# Patient Record
Sex: MALE | Race: WHITE
[De-identification: names, ages, dates, MRNs, and addresses within clinical notes are randomized per-mention and may not be internally consistent; named-entity substitution may affect disease eponyms.]

---

## 2019-11-09 ENCOUNTER — HEALTH MAINTENANCE LETTER (OUTPATIENT)
Age: 59
End: 2019-11-09

## 2020-12-06 ENCOUNTER — HEALTH MAINTENANCE LETTER (OUTPATIENT)
Age: 60
End: 2020-12-06

## 2021-09-26 ENCOUNTER — HEALTH MAINTENANCE LETTER (OUTPATIENT)
Age: 61
End: 2021-09-26

## 2022-01-15 ENCOUNTER — HEALTH MAINTENANCE LETTER (OUTPATIENT)
Age: 62
End: 2022-01-15

## 2023-04-23 ENCOUNTER — HEALTH MAINTENANCE LETTER (OUTPATIENT)
Age: 63
End: 2023-04-23

## 2023-09-13 ENCOUNTER — HOSPITAL ENCOUNTER (OUTPATIENT)
Dept: DATA CONVERSION | Facility: HOSPITAL | Age: 63
End: 2023-09-13

## 2023-09-18 ENCOUNTER — HOSPITAL ENCOUNTER (OUTPATIENT)
Dept: DATA CONVERSION | Facility: HOSPITAL | Age: 63
Discharge: HOME | End: 2023-09-18
Payer: COMMERCIAL

## 2023-09-18 DIAGNOSIS — Z00.6 ENCOUNTER FOR EXAMINATION FOR NORMAL COMPARISON AND CONTROL IN CLINICAL RESEARCH PROGRAM: ICD-10-CM

## 2023-09-18 DIAGNOSIS — C61 MALIGNANT NEOPLASM OF PROSTATE (MULTI): ICD-10-CM

## 2023-09-18 LAB
ALBUMIN SERPL-MCNC: 4.1 GM/DL (ref 3.5–5)
ALBUMIN/GLOB SERPL: 1.8 RATIO (ref 1.5–3)
ALP BLD-CCNC: 81 U/L (ref 35–125)
ALT SERPL-CCNC: 16 U/L (ref 5–40)
ANION GAP SERPL CALCULATED.3IONS-SCNC: 11 MMOL/L (ref 0–19)
AST SERPL-CCNC: 20 U/L (ref 5–40)
BASOPHILS # BLD AUTO: 0.02 K/UL (ref 0–0.22)
BASOPHILS NFR BLD AUTO: 0.5 % (ref 0–1)
BILIRUB SERPL-MCNC: 0.3 MG/DL (ref 0.1–1.2)
BUN SERPL-MCNC: 19 MG/DL (ref 8–25)
BUN/CREAT SERPL: 17.3 RATIO (ref 8–21)
CALCIUM SERPL-MCNC: 8.8 MG/DL (ref 8.5–10.4)
CHLORIDE SERPL-SCNC: 106 MMOL/L (ref 97–107)
CO2 SERPL-SCNC: 26 MMOL/L (ref 24–31)
CREAT SERPL-MCNC: 1.1 MG/DL (ref 0.4–1.6)
DEPRECATED RDW RBC AUTO: 43.9 FL (ref 37–54)
DIFFERENTIAL METHOD BLD: ABNORMAL
EOSINOPHIL # BLD AUTO: 0.12 K/UL (ref 0–0.45)
EOSINOPHIL NFR BLD: 3 % (ref 0–3)
ERYTHROCYTE [DISTWIDTH] IN BLOOD BY AUTOMATED COUNT: 13.3 % (ref 11.7–15)
GFR SERPL CREATININE-BSD FRML MDRD: 75 ML/MIN/1.73 M2
GLOBULIN SER-MCNC: 2.3 G/DL (ref 1.9–3.7)
GLUCOSE SERPL-MCNC: 110 MG/DL (ref 65–99)
HCT VFR BLD AUTO: 39.6 % (ref 41–50)
HGB BLD-MCNC: 13.2 GM/DL (ref 13.5–16.5)
IMM GRANULOCYTES # BLD AUTO: 0.01 K/UL (ref 0–0.1)
LYMPHOCYTES # BLD AUTO: 0.78 K/UL (ref 1.2–3.2)
LYMPHOCYTES NFR BLD MANUAL: 19.5 % (ref 20–40)
MCH RBC QN AUTO: 29.9 PG (ref 26–34)
MCHC RBC AUTO-ENTMCNC: 33.3 % (ref 31–37)
MCV RBC AUTO: 89.8 FL (ref 80–100)
MONOCYTES # BLD AUTO: 0.49 K/UL (ref 0–0.8)
MONOCYTES NFR BLD MANUAL: 12.2 % (ref 0–8)
NEUTROPHILS # BLD AUTO: 2.59 K/UL
NEUTROPHILS # BLD AUTO: 2.59 K/UL (ref 1.8–7.7)
NEUTROPHILS.IMMATURE NFR BLD: 0.2 % (ref 0–1)
NEUTS SEG NFR BLD: 64.6 % (ref 50–70)
NRBC BLD-RTO: 0 /100 WBC
PLATELET # BLD AUTO: 184 K/UL (ref 150–450)
PMV BLD AUTO: 11.2 CU (ref 7–12.6)
POTASSIUM SERPL-SCNC: 4.2 MMOL/L (ref 3.4–5.1)
PROT SERPL-MCNC: 6.4 G/DL (ref 5.9–7.9)
RBC # BLD AUTO: 4.41 M/UL (ref 4.5–5.5)
SODIUM SERPL-SCNC: 142 MMOL/L (ref 133–145)
TESTOST SERPL-MCNC: 254 NG/DL (ref 280–800)
WBC # BLD AUTO: 4 K/UL (ref 4.5–11)

## 2023-09-19 LAB — PSA SERPL-MCNC: 0.1 NG/ML (ref 0–4.1)

## 2023-09-25 NOTE — PROGRESS NOTES
Clifton Gan is a 63 y.o. male on day 0 of admission presenting with No Principal Problem: There is no principal problem currently on the Problem List. Please update the Problem List and refresh..    Subjective   ***       Objective     Physical Exam    Last Recorded Vitals  There were no vitals taken for this visit.  Intake/Output last 3 Shifts:  No intake/output data recorded.    Relevant Results  {If you would like to pull in Medications, type .meds     If you would like to pull in Lab results for the last 24 hours, type .[id number]    If you would like to pull in Imaging results, type .imgrslt :99}    {Link to Stroke Scoring tools - Link :99}                       Assessment/Plan   Active Problems:  There are no active Hospital Problems.    ***     {This patient does not have an ACP note on file for this encounter, please fill one out - Advance Care Planning Activity :99}  I spent *** minutes in the professional and overall care of this patient.      Loco Elliott, APRN-CNP

## 2023-10-04 PROBLEM — E66.9 CLASS 1 OBESITY WITH BODY MASS INDEX (BMI) OF 30.0 TO 30.9 IN ADULT: Status: ACTIVE | Noted: 2023-10-04

## 2023-10-04 PROBLEM — K42.9 UMBILICAL HERNIA WITHOUT OBSTRUCTION AND WITHOUT GANGRENE: Status: ACTIVE | Noted: 2023-10-04

## 2023-10-04 PROBLEM — R00.1 BRADYCARDIA: Status: ACTIVE | Noted: 2023-10-04

## 2023-10-04 PROBLEM — I10 ESSENTIAL HYPERTENSION: Status: ACTIVE | Noted: 2023-10-04

## 2023-10-04 PROBLEM — N52.9 IMPOTENCE OF ORGANIC ORIGIN: Status: ACTIVE | Noted: 2023-10-04

## 2023-10-04 PROBLEM — R06.02 SHORTNESS OF BREATH ON EXERTION: Status: ACTIVE | Noted: 2022-11-14

## 2023-10-04 PROBLEM — Z87.898 HISTORY OF PALPITATIONS: Status: ACTIVE | Noted: 2023-10-04

## 2023-10-04 PROBLEM — I10 HIGH BLOOD PRESSURE: Status: ACTIVE | Noted: 2023-10-04

## 2023-10-04 PROBLEM — E78.2 MIXED HYPERLIPIDEMIA: Status: ACTIVE | Noted: 2023-10-04

## 2023-10-04 PROBLEM — C61 PROSTATE CANCER (MULTI): Status: ACTIVE | Noted: 2023-10-04

## 2023-10-04 PROBLEM — E66.811 CLASS 1 OBESITY WITH BODY MASS INDEX (BMI) OF 30.0 TO 30.9 IN ADULT: Status: ACTIVE | Noted: 2023-10-04

## 2023-10-04 RX ORDER — DOCUSATE SODIUM 100 MG/1
200 CAPSULE, LIQUID FILLED ORAL DAILY
COMMUNITY
End: 2023-10-05

## 2023-10-04 RX ORDER — KETOROLAC TROMETHAMINE 5 MG/ML
1 SOLUTION OPHTHALMIC 3 TIMES DAILY
COMMUNITY
End: 2023-10-05

## 2023-10-04 RX ORDER — HYDROCHLOROTHIAZIDE 25 MG/1
TABLET ORAL
COMMUNITY
Start: 2022-08-08 | End: 2023-10-05

## 2023-10-04 RX ORDER — ASPIRIN 81 MG/1
TABLET ORAL
COMMUNITY

## 2023-10-04 RX ORDER — METOPROLOL TARTRATE 25 MG/1
TABLET, FILM COATED ORAL
COMMUNITY
Start: 2023-02-01 | End: 2023-10-05 | Stop reason: ALTCHOICE

## 2023-10-04 RX ORDER — TRIAZOLAM 0.12 MG/1
TABLET ORAL
COMMUNITY
Start: 2023-03-23 | End: 2023-10-05 | Stop reason: ALTCHOICE

## 2023-10-04 RX ORDER — CARVEDILOL 6.25 MG/1
12.5 TABLET ORAL 2 TIMES DAILY
COMMUNITY
Start: 2022-10-17 | End: 2024-01-24 | Stop reason: ALTCHOICE

## 2023-10-04 RX ORDER — AMOXICILLIN 500 MG/1
CAPSULE ORAL
COMMUNITY
Start: 2023-03-23 | End: 2023-10-05

## 2023-10-04 RX ORDER — IBUPROFEN 600 MG/1
TABLET ORAL
COMMUNITY
Start: 2023-03-23 | End: 2023-10-05

## 2023-10-04 RX ORDER — OLMESARTAN MEDOXOMIL 20 MG/1
1 TABLET ORAL DAILY
COMMUNITY
Start: 2022-10-10 | End: 2023-10-05

## 2023-10-04 RX ORDER — MOMETASONE FUROATE 50 UG/1
1 SPRAY, METERED NASAL 2 TIMES DAILY
COMMUNITY
End: 2023-10-05

## 2023-10-04 RX ORDER — TAMSULOSIN HYDROCHLORIDE 0.4 MG/1
0.8 CAPSULE ORAL DAILY
COMMUNITY
Start: 2022-10-06 | End: 2023-10-05

## 2023-10-04 RX ORDER — SILDENAFIL 100 MG/1
100 TABLET, FILM COATED ORAL DAILY PRN
COMMUNITY
Start: 2016-03-14 | End: 2023-10-05

## 2023-10-04 RX ORDER — PREDNISONE 5 MG/1
1 TABLET ORAL DAILY
COMMUNITY
Start: 2022-09-21 | End: 2023-10-05

## 2023-10-04 RX ORDER — CHLORHEXIDINE GLUCONATE ORAL RINSE 1.2 MG/ML
SOLUTION DENTAL
COMMUNITY
Start: 2023-03-27 | End: 2023-10-05

## 2023-10-04 RX ORDER — ABIRATERONE 500 MG/1
1000 TABLET ORAL
COMMUNITY
Start: 2022-10-10 | End: 2023-10-05

## 2023-10-04 RX ORDER — AMLODIPINE BESYLATE 10 MG/1
10 TABLET ORAL DAILY
COMMUNITY
End: 2023-11-07

## 2023-10-05 ENCOUNTER — OFFICE VISIT (OUTPATIENT)
Dept: CARDIOLOGY | Facility: CLINIC | Age: 63
End: 2023-10-05
Payer: COMMERCIAL

## 2023-10-05 VITALS
DIASTOLIC BLOOD PRESSURE: 87 MMHG | RESPIRATION RATE: 18 BRPM | OXYGEN SATURATION: 95 % | SYSTOLIC BLOOD PRESSURE: 141 MMHG | HEART RATE: 60 BPM | TEMPERATURE: 98.1 F

## 2023-10-05 DIAGNOSIS — E78.2 MIXED HYPERLIPIDEMIA: ICD-10-CM

## 2023-10-05 DIAGNOSIS — C61 PROSTATE CANCER (MULTI): ICD-10-CM

## 2023-10-05 DIAGNOSIS — I10 HYPERTENSION, UNSPECIFIED TYPE: Primary | ICD-10-CM

## 2023-10-05 PROCEDURE — 1036F TOBACCO NON-USER: CPT | Performed by: NURSE PRACTITIONER

## 2023-10-05 PROCEDURE — 99213 OFFICE O/P EST LOW 20 MIN: CPT | Performed by: NURSE PRACTITIONER

## 2023-10-05 PROCEDURE — 3077F SYST BP >= 140 MM HG: CPT | Performed by: NURSE PRACTITIONER

## 2023-10-05 PROCEDURE — 3079F DIAST BP 80-89 MM HG: CPT | Performed by: NURSE PRACTITIONER

## 2023-10-05 ASSESSMENT — PAIN SCALES - GENERAL: PAINLEVEL: 0-NO PAIN

## 2023-10-05 NOTE — PROGRESS NOTES
Chief complaint - BP check  HPI:  BP up this morning.  His energy is better.   Exercises >4mets about 4 times per week.    Denies SOB, chest pain, syncope, +edema but less. No orthopnea, PND.    Full 14 point ROS complete and negative.    /89   PE:  alert, NAD  Lungs CTA  Heart RRR S1S2 no m/r/g no jvd  1+ ankle edema bilat  Abd round, nontender  Extrem well perfused, PIERSON =    A/P  62 yo CM with h/o prostate cancer, now off ADT.  Feeling well.  Last echo normal in 11/2022.  HTN is moderately controlled.      - continue same meds for now  - telephone follow up to ensure BP is not increasing  - would add ACEI + hydrochlorothiazide and stop amlodipine if BP staying up.

## 2023-10-06 PROBLEM — I10 ESSENTIAL HYPERTENSION: Chronic | Status: ACTIVE | Noted: 2023-10-04

## 2023-11-07 DIAGNOSIS — I10 PRIMARY HYPERTENSION: Primary | ICD-10-CM

## 2023-11-07 RX ORDER — AMLODIPINE BESYLATE 10 MG/1
10 TABLET ORAL DAILY
Qty: 90 TABLET | Refills: 1 | Status: SHIPPED | OUTPATIENT
Start: 2023-11-07 | End: 2024-02-28 | Stop reason: SDUPTHER

## 2023-11-28 ENCOUNTER — TELEPHONE (OUTPATIENT)
Dept: RADIATION ONCOLOGY | Facility: HOSPITAL | Age: 63
End: 2023-11-28
Payer: COMMERCIAL

## 2023-11-29 NOTE — PROGRESS NOTES
Cancer synopsis:  Rad/onc: Dr. Gibson/ Lexi CNP  Med/onc: Dr. Arvizu/ Krissy CNP    2010 PSA 10, Prostate Bx negative per patient   2020 Prostate Bx negative per patient      2/2/22: PSA 45. MRI shows 2 lesions at R central zone of prostate     7/12/22: TRUS Prostate bx reveals Prostate Adenocarcinoma. GS 4+4=8, GG4 1/2 cores, 10% of tissue, No LVI     7/26/22: NM Tc 99 bone scan reveals no definitive evidence of  metastatic disease. CT C/A/P without definitive evidence of metastatic disease  8/22/2022: PSMA PET  with hypermetabolic focus on the R prostate gland  (SUVmax 17.4). No ROYAL  or metastatic disease.  9/21/2022: Cycle 1 [facility] 1821  10/11/2022: Abiraterone held due to grade 3 HTN  10/14/2022: Resumed Abiraterone  11/30/2022: Start XRT to prostate (complete 12/9/22)  3/8/2023: Off treatment, completed all 6 cycles on [id number]    History of presenting illness:    Patient ID: [id number]     Clifton Gan is a 63 y.o. male who presents for his high risk prostate cancer GS 4+4=8, IPSA 10 now s/p SBRT to prostate and 6m of intensified systemic therapy per [facility] 1821    RT Site: prostate  RT Date: 11/30/2022  Hormone therapy: Yes, ADT, naraparib and AA/p 6m done 03/08/2023  Hot Flushes: Denies  Fatigue: Denies. Report mood and energy have overall improved now with testerone return.  Bone pain: Denies  ASHLEY: 1  ED: Admits to decline in erectile function due to lack of interest but not overall concerned per patient. Does have PDE5i.  ED medications: yes, Viagra has not used yet.  IPSS:   Urinary symptoms: Admits to LUTS including weak stream, frequency and urinary dribble when not taking flowmax BID. Denies dysuria, hematuria or urine leakage.  Urinary Medications: No  Rectal bleeding: Denies  Other systems: Denies SOB, Cp or fever.      Review of systems:  Review of Systems   Constitutional:  Negative for fatigue, fever and unexpected weight change.   Respiratory:  Negative for cough, chest tightness and shortness  "of breath.    Cardiovascular:  Negative for chest pain, palpitations and leg swelling.   Gastrointestinal:  Negative for abdominal pain, anal bleeding, blood in stool, constipation, diarrhea and rectal pain.   Endocrine: Negative for cold intolerance, heat intolerance and polyuria.   Genitourinary:  Negative for decreased urine volume, difficulty urinating, dysuria, frequency, hematuria and urgency.   Musculoskeletal:  Negative for arthralgias, back pain, gait problem and joint swelling.   Skin: Negative.    Allergic/Immunologic: Negative.    Neurological:  Negative for dizziness, syncope and weakness.   Psychiatric/Behavioral: Negative.         Past Medical history  Past Medical History:   Diagnosis Date    Other acute postprocedural pain 05/12/2021    Acute post-operative pain    Personal history of other diseases of the circulatory system 04/02/2021    History of hypertension    Personal history of other specified conditions 10/10/2022    History of palpitations        Surgical/family history  Family History   Problem Relation Name Age of Onset    Prostate cancer Brother        Past Surgical History:   Procedure Laterality Date    OTHER SURGICAL HISTORY  10/10/2022    Umbilical hernia repair        Social History  Tobacco Use: High Risk (11/30/2023)    Patient History     Smoking Tobacco Use: Some Days     Smokeless Tobacco Use: Never     Passive Exposure: Not on file         Current med list:  Current Outpatient Medications   Medication Instructions    amLODIPine (NORVASC) 10 mg, oral, Daily    aspirin 81 mg EC tablet     carvedilol (COREG) 12.5 mg, oral, 2 times daily    mv-min/folic/K1/lycopen/lutein (MEN 50 PLUS MULTIVITAMIN ORAL) oral        Last recorded vital:  /88 (BP Location: Right arm, Patient Position: Sitting, BP Cuff Size: Large adult)   Pulse 60   Temp 35.7 °C (96.3 °F) (Temporal)   Resp 18   Ht 1.791 m (5' 10.5\")   Wt 99.5 kg (219 lb 5.7 oz)   SpO2 97%   BMI 31.03 kg/m²     Physical " exam  Physical Exam  Constitutional:       Appearance: Normal appearance.   Cardiovascular:      Rate and Rhythm: Normal rate.   Pulmonary:      Effort: Pulmonary effort is normal.      Breath sounds: Normal breath sounds.   Musculoskeletal:         General: Normal range of motion.      Cervical back: Normal range of motion.   Neurological:      Mental Status: He is alert and oriented to person, place, and time.   Psychiatric:         Mood and Affect: Mood normal.         Behavior: Behavior normal.         Thought Content: Thought content normal.         Judgment: Judgment normal.     ECOG Performance Status: 0 Fully Active   Karnofsky Score (Age >/ 16 yrs): 100- Normal, No  complaints, no evidence of disease       Pertinent labs:  PSA   Date/Time Value Ref Range Status   09/18/2023 01:14 PM 0.1 0.0 - 4.1 NG/ML Final     Comment:     PATIENTS WITH BENIGN PROSTATIC HYPERTROPHY OFTEN DEMONSTRATE VALUES   BETWEEN 4.0-10.0 NG/ML. RESULTS BETWEEN 4.0 AND 10.0 NG/ML MAY   REPRESENT BENIGN PROSTATIC HYPERTROPHY OR POSSIBLE CANCER OF THE   PROSTATE. RESULTS ABOVE 10.0 NG/ML ARE HIGHLY SUGGESTIVE OF PROSTATIC   CANCER.  Performed at [address] [address]           Dx:  Problem List Items Addressed This Visit       Prostate cancer (CMS/[facility]) - Primary    Relevant Orders    PSA, Total and Free    Testosterone    Comprehensive Metabolic Panel    CBC and Auto Differential   Reviewed need for labs today per [facility] 1821 will call with results. Review of latent SE including rectal bleeding, hematuria, urinary strictures, ED where reviewed as well as how to contact office if s/s present. Denies latent SE. NCCN guidelines where reviewed and routine FUV of every 3m for first year and every 6m for four years for a total of five years was discussed. Patient verbalized understanding.     Recommend vitamin D supplementation, start (or increase by) 400-1000 units daily. Reviewed importance of intake while on  Testerone lowering therapy as well as after until Testerone re-establishes, at which point may stop if DEXA indicative of normal bone density. Also reviewed that individuals at a higher risk such as those over the age of 75 or those with a hx of bone fx, osteoporosis or osteopenia to continue supplementation indefinitely with routine DEXA imaging as per national guidelines to assess bone health continually.     Discussed elevated SBP. Reports not taking BP medications. Reviewed need to take medication as prescribed d/t risk of small vessel damage. Patient amendable.    PLAN:  FUV 3m  Labs Labs today and in 3m (CBC/CMP/ Psa and testerone)  Imaging none  FUV other providers: PCP for routine evals        Please contact office with any concerns:  Loco Chavez CNP  [phone]

## 2023-11-30 ENCOUNTER — HOSPITAL ENCOUNTER (OUTPATIENT)
Dept: RADIATION ONCOLOGY | Facility: HOSPITAL | Age: 63
Setting detail: RADIATION/ONCOLOGY SERIES
Discharge: HOME | End: 2023-11-30
Payer: COMMERCIAL

## 2023-11-30 ENCOUNTER — LAB (OUTPATIENT)
Dept: LAB | Facility: HOSPITAL | Age: 63
End: 2023-11-30
Payer: COMMERCIAL

## 2023-11-30 VITALS
BODY MASS INDEX: 30.71 KG/M2 | OXYGEN SATURATION: 97 % | HEIGHT: 71 IN | SYSTOLIC BLOOD PRESSURE: 156 MMHG | TEMPERATURE: 96.3 F | HEART RATE: 60 BPM | DIASTOLIC BLOOD PRESSURE: 88 MMHG | RESPIRATION RATE: 18 BRPM | WEIGHT: 219.36 LBS

## 2023-11-30 DIAGNOSIS — C61 PROSTATE CANCER (MULTI): Primary | ICD-10-CM

## 2023-11-30 DIAGNOSIS — R39.9 LOWER URINARY TRACT SYMPTOMS (LUTS): ICD-10-CM

## 2023-11-30 DIAGNOSIS — C61 PROSTATE CANCER (MULTI): ICD-10-CM

## 2023-11-30 LAB
ALBUMIN SERPL BCP-MCNC: 4.4 G/DL (ref 3.4–5)
ALP SERPL-CCNC: 74 U/L (ref 33–136)
ALT SERPL W P-5'-P-CCNC: 17 U/L (ref 10–52)
ANION GAP SERPL CALC-SCNC: 12 MMOL/L (ref 10–20)
AST SERPL W P-5'-P-CCNC: 16 U/L (ref 9–39)
BASOPHILS # BLD AUTO: 0.01 X10*3/UL (ref 0–0.1)
BASOPHILS NFR BLD AUTO: 0.2 %
BILIRUB SERPL-MCNC: 0.4 MG/DL (ref 0–1.2)
BUN SERPL-MCNC: 18 MG/DL (ref 6–23)
CALCIUM SERPL-MCNC: 9.4 MG/DL (ref 8.6–10.3)
CHLORIDE SERPL-SCNC: 102 MMOL/L (ref 98–107)
CO2 SERPL-SCNC: 27 MMOL/L (ref 21–32)
CREAT SERPL-MCNC: 0.95 MG/DL (ref 0.5–1.3)
EOSINOPHIL # BLD AUTO: 0.07 X10*3/UL (ref 0–0.7)
EOSINOPHIL NFR BLD AUTO: 1.2 %
ERYTHROCYTE [DISTWIDTH] IN BLOOD BY AUTOMATED COUNT: 13.2 % (ref 11.5–14.5)
GFR SERPL CREATININE-BSD FRML MDRD: 89 ML/MIN/1.73M*2
GLUCOSE SERPL-MCNC: 93 MG/DL (ref 74–99)
HCT VFR BLD AUTO: 41.9 % (ref 41–52)
HGB BLD-MCNC: 14.3 G/DL (ref 13.5–17.5)
IMM GRANULOCYTES # BLD AUTO: 0.02 X10*3/UL (ref 0–0.7)
IMM GRANULOCYTES NFR BLD AUTO: 0.4 % (ref 0–0.9)
LYMPHOCYTES # BLD AUTO: 0.83 X10*3/UL (ref 1.2–4.8)
LYMPHOCYTES NFR BLD AUTO: 14.6 %
MCH RBC QN AUTO: 30.6 PG (ref 26–34)
MCHC RBC AUTO-ENTMCNC: 34.1 G/DL (ref 32–36)
MCV RBC AUTO: 90 FL (ref 80–100)
MONOCYTES # BLD AUTO: 0.62 X10*3/UL (ref 0.1–1)
MONOCYTES NFR BLD AUTO: 10.9 %
NEUTROPHILS # BLD AUTO: 4.14 X10*3/UL (ref 1.2–7.7)
NEUTROPHILS NFR BLD AUTO: 72.7 %
NRBC BLD-RTO: 0 /100 WBCS (ref 0–0)
PLATELET # BLD AUTO: 216 X10*3/UL (ref 150–450)
POTASSIUM SERPL-SCNC: 4.4 MMOL/L (ref 3.5–5.3)
PROT SERPL-MCNC: 7 G/DL (ref 6.4–8.2)
RBC # BLD AUTO: 4.68 X10*6/UL (ref 4.5–5.9)
SODIUM SERPL-SCNC: 137 MMOL/L (ref 136–145)
TESTOST SERPL-MCNC: 416 NG/DL (ref 240–1000)
WBC # BLD AUTO: 5.7 X10*3/UL (ref 4.4–11.3)

## 2023-11-30 PROCEDURE — 80053 COMPREHEN METABOLIC PANEL: CPT

## 2023-11-30 PROCEDURE — 36415 COLL VENOUS BLD VENIPUNCTURE: CPT

## 2023-11-30 PROCEDURE — 84403 ASSAY OF TOTAL TESTOSTERONE: CPT

## 2023-11-30 PROCEDURE — 99214 OFFICE O/P EST MOD 30 MIN: CPT

## 2023-11-30 PROCEDURE — 84153 ASSAY OF PSA TOTAL: CPT

## 2023-11-30 PROCEDURE — 85025 COMPLETE CBC W/AUTO DIFF WBC: CPT

## 2023-11-30 PROCEDURE — 84154 ASSAY OF PSA FREE: CPT

## 2023-11-30 RX ORDER — TAMSULOSIN HYDROCHLORIDE 0.4 MG/1
0.4 CAPSULE ORAL 2 TIMES DAILY
Qty: 60 CAPSULE | Refills: 6 | Status: SHIPPED | OUTPATIENT
Start: 2023-11-30 | End: 2024-06-27

## 2023-11-30 ASSESSMENT — COLUMBIA-SUICIDE SEVERITY RATING SCALE - C-SSRS
2. HAVE YOU ACTUALLY HAD ANY THOUGHTS OF KILLING YOURSELF?: NO
6. HAVE YOU EVER DONE ANYTHING, STARTED TO DO ANYTHING, OR PREPARED TO DO ANYTHING TO END YOUR LIFE?: NO
1. IN THE PAST MONTH, HAVE YOU WISHED YOU WERE DEAD OR WISHED YOU COULD GO TO SLEEP AND NOT WAKE UP?: NO

## 2023-11-30 ASSESSMENT — ENCOUNTER SYMPTOMS
FATIGUE: 0
FEVER: 0
BLOOD IN STOOL: 0
PSYCHIATRIC NEGATIVE: 1
RECTAL PAIN: 0
FREQUENCY: 0
BACK PAIN: 0
CHEST TIGHTNESS: 0
ANAL BLEEDING: 0
UNEXPECTED WEIGHT CHANGE: 0
JOINT SWELLING: 0
CONSTIPATION: 0
OCCASIONAL FEELINGS OF UNSTEADINESS: 0
ABDOMINAL PAIN: 0
DYSURIA: 0
DEPRESSION: 0
DIZZINESS: 0
HEMATURIA: 0
COUGH: 0
DIARRHEA: 0
WEAKNESS: 0
ALLERGIC/IMMUNOLOGIC NEGATIVE: 1
SHORTNESS OF BREATH: 0
ARTHRALGIAS: 0
PALPITATIONS: 0
DIFFICULTY URINATING: 0
LOSS OF SENSATION IN FEET: 0

## 2023-11-30 ASSESSMENT — PATIENT HEALTH QUESTIONNAIRE - PHQ9
2. FEELING DOWN, DEPRESSED OR HOPELESS: NOT AT ALL
1. LITTLE INTEREST OR PLEASURE IN DOING THINGS: NOT AT ALL
SUM OF ALL RESPONSES TO PHQ9 QUESTIONS 1 AND 2: 0

## 2023-11-30 ASSESSMENT — PAIN SCALES - GENERAL: PAINLEVEL: 0-NO PAIN

## 2023-12-02 LAB
PSA FREE MFR SERPL: <100 %
PSA FREE SERPL-MCNC: <0.1 NG/ML
PSA SERPL IA-MCNC: 0.1 NG/ML (ref 0–4)

## 2024-01-24 ENCOUNTER — DOCUMENTATION (OUTPATIENT)
Dept: CARDIOLOGY | Facility: CLINIC | Age: 64
End: 2024-01-24

## 2024-01-24 DIAGNOSIS — I10 ESSENTIAL HYPERTENSION: Primary | Chronic | ICD-10-CM

## 2024-01-24 RX ORDER — LISINOPRIL 10 MG/1
10 TABLET ORAL DAILY
Qty: 30 TABLET | Refills: 3 | Status: SHIPPED | OUTPATIENT
Start: 2024-01-24 | End: 2024-05-14

## 2024-01-24 NOTE — PROGRESS NOTES
Patient called to say his exercise tolerance is not good and he feels like he cannot get his heart rate up.  He also feels very sleepy after working out.    - discontinue carvedilol  - start lisinopril 10mg daily  - 2 week BMP  - follow up in 2-4 weeks in office

## 2024-02-06 NOTE — ADDENDUM NOTE
Encounter addended by: MACRINA Montelongo-CNP on: 2/6/2024 8:57 AM   Actions taken: Clinical Note Signed

## 2024-02-13 ENCOUNTER — TELEMEDICINE (OUTPATIENT)
Dept: PRIMARY CARE | Facility: CLINIC | Age: 64
End: 2024-02-13
Payer: COMMERCIAL

## 2024-02-13 VITALS
WEIGHT: 210 LBS | DIASTOLIC BLOOD PRESSURE: 83 MMHG | TEMPERATURE: 98.7 F | HEART RATE: 69 BPM | BODY MASS INDEX: 29.71 KG/M2 | SYSTOLIC BLOOD PRESSURE: 125 MMHG

## 2024-02-13 DIAGNOSIS — J01.00 ACUTE NON-RECURRENT MAXILLARY SINUSITIS: Primary | ICD-10-CM

## 2024-02-13 PROCEDURE — 99213 OFFICE O/P EST LOW 20 MIN: CPT | Performed by: FAMILY MEDICINE

## 2024-02-13 PROCEDURE — 3079F DIAST BP 80-89 MM HG: CPT | Performed by: FAMILY MEDICINE

## 2024-02-13 PROCEDURE — 3074F SYST BP LT 130 MM HG: CPT | Performed by: FAMILY MEDICINE

## 2024-02-13 RX ORDER — DOXYCYCLINE 100 MG/1
100 CAPSULE ORAL 2 TIMES DAILY
Qty: 14 CAPSULE | Refills: 0 | Status: SHIPPED | OUTPATIENT
Start: 2024-02-13 | End: 2024-02-20

## 2024-02-13 NOTE — PROGRESS NOTES
Subjective   Patient ID: Clifton Gan is a 63 y.o. male who presents for No chief complaint on file..    HPI   Presents today with 9 or 10 days of sinus congestion.  He is get some fullness in the nose and maxillary sinus area.  Has a lot of green discharge.  He has developed a cough over the last 2 days.  He states he had been out traveling [place] and moving through airports in both stations and might of gotten a little rundown.  No fever today.  No signs are the ones reported by him.    He did have his carvedilol stopped and he changed over to lisinopril and since then he is felt pretty good.  He was unable to get his heart rate up at all on the carvedilol and now can exercise and feel good while he is doing so.  Review of Systems    Objective   /83   Pulse 69   Temp 37.1 °C (98.7 °F)   Wt 95.3 kg (210 lb)   BMI 29.71 kg/m²     Physical Exam  He is alert, no apparent distress, his affect is pleasant.  He has a nasal sounding voice.  Respirations are easy.  Conjunctiva appear normal.    Assessment/Plan   Diagnoses and all orders for this visit:  Acute non-recurrent maxillary sinusitis  -     doxycycline (Vibramycin) 100 mg capsule; Take 1 capsule (100 mg) by mouth 2 times a day for 7 days. Take with at least 8 ounces (large glass) of water, do not lie down for 30 minutes after  We use the doxycycline and see how he does over the next few days.  He is not improving much by Friday, he will call.

## 2024-02-28 ENCOUNTER — TELEPHONE (OUTPATIENT)
Dept: RADIATION ONCOLOGY | Facility: HOSPITAL | Age: 64
End: 2024-02-28
Payer: COMMERCIAL

## 2024-02-28 DIAGNOSIS — I10 PRIMARY HYPERTENSION: ICD-10-CM

## 2024-02-28 RX ORDER — AMLODIPINE BESYLATE 10 MG/1
10 TABLET ORAL DAILY
Qty: 90 TABLET | Refills: 3 | Status: SHIPPED | OUTPATIENT
Start: 2024-02-28 | End: 2025-02-27

## 2024-03-01 ENCOUNTER — LAB (OUTPATIENT)
Dept: LAB | Facility: LAB | Age: 64
End: 2024-03-01
Payer: COMMERCIAL

## 2024-03-01 DIAGNOSIS — C61 PROSTATE CANCER (MULTI): ICD-10-CM

## 2024-03-01 DIAGNOSIS — I10 ESSENTIAL HYPERTENSION: Chronic | ICD-10-CM

## 2024-03-01 LAB
ALBUMIN SERPL BCP-MCNC: 4.2 G/DL (ref 3.4–5)
ALP SERPL-CCNC: 77 U/L (ref 33–136)
ALT SERPL W P-5'-P-CCNC: 17 U/L (ref 10–52)
ANION GAP SERPL CALC-SCNC: 12 MMOL/L (ref 10–20)
AST SERPL W P-5'-P-CCNC: 17 U/L (ref 9–39)
BASOPHILS # BLD AUTO: 0.03 X10*3/UL (ref 0–0.1)
BASOPHILS NFR BLD AUTO: 0.6 %
BILIRUB SERPL-MCNC: 0.5 MG/DL (ref 0–1.2)
BUN SERPL-MCNC: 14 MG/DL (ref 6–23)
CALCIUM SERPL-MCNC: 8.8 MG/DL (ref 8.6–10.3)
CHLORIDE SERPL-SCNC: 102 MMOL/L (ref 98–107)
CO2 SERPL-SCNC: 26 MMOL/L (ref 21–32)
CREAT SERPL-MCNC: 1.01 MG/DL (ref 0.5–1.3)
EGFRCR SERPLBLD CKD-EPI 2021: 83 ML/MIN/1.73M*2
EOSINOPHIL # BLD AUTO: 0.22 X10*3/UL (ref 0–0.7)
EOSINOPHIL NFR BLD AUTO: 4.3 %
ERYTHROCYTE [DISTWIDTH] IN BLOOD BY AUTOMATED COUNT: 12.4 % (ref 11.5–14.5)
GLUCOSE SERPL-MCNC: 88 MG/DL (ref 74–99)
HCT VFR BLD AUTO: 42.6 % (ref 41–52)
HGB BLD-MCNC: 14.1 G/DL (ref 13.5–17.5)
IMM GRANULOCYTES # BLD AUTO: 0.01 X10*3/UL (ref 0–0.7)
IMM GRANULOCYTES NFR BLD AUTO: 0.2 % (ref 0–0.9)
LYMPHOCYTES # BLD AUTO: 0.96 X10*3/UL (ref 1.2–4.8)
LYMPHOCYTES NFR BLD AUTO: 18.7 %
MCH RBC QN AUTO: 31 PG (ref 26–34)
MCHC RBC AUTO-ENTMCNC: 33.1 G/DL (ref 32–36)
MCV RBC AUTO: 94 FL (ref 80–100)
MONOCYTES # BLD AUTO: 0.55 X10*3/UL (ref 0.1–1)
MONOCYTES NFR BLD AUTO: 10.7 %
NEUTROPHILS # BLD AUTO: 3.36 X10*3/UL (ref 1.2–7.7)
NEUTROPHILS NFR BLD AUTO: 65.5 %
NRBC BLD-RTO: 0.4 /100 WBCS (ref 0–0)
PLATELET # BLD AUTO: 192 X10*3/UL (ref 150–450)
POTASSIUM SERPL-SCNC: 4 MMOL/L (ref 3.5–5.3)
PROT SERPL-MCNC: 6.4 G/DL (ref 6.4–8.2)
RBC # BLD AUTO: 4.55 X10*6/UL (ref 4.5–5.9)
SODIUM SERPL-SCNC: 136 MMOL/L (ref 136–145)
WBC # BLD AUTO: 5.1 X10*3/UL (ref 4.4–11.3)

## 2024-03-01 PROCEDURE — 85025 COMPLETE CBC W/AUTO DIFF WBC: CPT

## 2024-03-01 PROCEDURE — 84153 ASSAY OF PSA TOTAL: CPT

## 2024-03-01 PROCEDURE — 84403 ASSAY OF TOTAL TESTOSTERONE: CPT

## 2024-03-01 PROCEDURE — 80053 COMPREHEN METABOLIC PANEL: CPT

## 2024-03-01 PROCEDURE — 36415 COLL VENOUS BLD VENIPUNCTURE: CPT

## 2024-03-01 PROCEDURE — 84154 ASSAY OF PSA FREE: CPT

## 2024-03-02 LAB — TESTOST SERPL-MCNC: 390 NG/DL (ref 240–1000)

## 2024-03-04 LAB
PSA FREE MFR SERPL: <100 %
PSA FREE SERPL-MCNC: <0.1 NG/ML
PSA SERPL IA-MCNC: 0.1 NG/ML (ref 0–4)

## 2024-03-05 ENCOUNTER — TELEPHONE (OUTPATIENT)
Dept: RADIATION ONCOLOGY | Facility: HOSPITAL | Age: 64
End: 2024-03-05
Payer: COMMERCIAL

## 2024-03-06 ENCOUNTER — APPOINTMENT (OUTPATIENT)
Dept: RADIATION ONCOLOGY | Facility: HOSPITAL | Age: 64
End: 2024-03-06
Payer: COMMERCIAL

## 2024-03-07 ENCOUNTER — TELEPHONE (OUTPATIENT)
Dept: RADIATION ONCOLOGY | Facility: HOSPITAL | Age: 64
End: 2024-03-07
Payer: COMMERCIAL

## 2024-03-07 NOTE — TELEPHONE ENCOUNTER
Called pt. to remind of appointment on 3/11/2024 at 3:30 pm with MONA Elliott. Pt's phone went to voicemail left number if needs to reschedule.

## 2024-03-11 ENCOUNTER — HOSPITAL ENCOUNTER (OUTPATIENT)
Dept: RADIATION ONCOLOGY | Facility: HOSPITAL | Age: 64
Setting detail: RADIATION/ONCOLOGY SERIES
Discharge: HOME | End: 2024-03-11
Payer: COMMERCIAL

## 2024-03-11 ENCOUNTER — DOCUMENTATION (OUTPATIENT)
Dept: RADIATION ONCOLOGY | Facility: HOSPITAL | Age: 64
End: 2024-03-11

## 2024-03-11 VITALS
RESPIRATION RATE: 18 BRPM | TEMPERATURE: 96.8 F | BODY MASS INDEX: 30.58 KG/M2 | HEIGHT: 70 IN | SYSTOLIC BLOOD PRESSURE: 139 MMHG | OXYGEN SATURATION: 96 % | HEART RATE: 79 BPM | DIASTOLIC BLOOD PRESSURE: 83 MMHG | WEIGHT: 213.6 LBS

## 2024-03-11 DIAGNOSIS — C61 PROSTATE CANCER (MULTI): Primary | ICD-10-CM

## 2024-03-11 PROCEDURE — 99214 OFFICE O/P EST MOD 30 MIN: CPT

## 2024-03-11 ASSESSMENT — ENCOUNTER SYMPTOMS
CHEST TIGHTNESS: 0
JOINT SWELLING: 0
FEVER: 0
FATIGUE: 0
WEAKNESS: 0
COUGH: 0
UNEXPECTED WEIGHT CHANGE: 0
DIFFICULTY URINATING: 0
DYSURIA: 0
HEMATURIA: 0
RECTAL PAIN: 0
OCCASIONAL FEELINGS OF UNSTEADINESS: 0
DIZZINESS: 0
DEPRESSION: 0
ANAL BLEEDING: 0
DIARRHEA: 0
BACK PAIN: 0
SHORTNESS OF BREATH: 0
BLOOD IN STOOL: 0
ALLERGIC/IMMUNOLOGIC NEGATIVE: 1
LOSS OF SENSATION IN FEET: 0
PALPITATIONS: 0
CONSTIPATION: 0
FREQUENCY: 0
ARTHRALGIAS: 0
ABDOMINAL PAIN: 0
PSYCHIATRIC NEGATIVE: 1

## 2024-03-11 ASSESSMENT — PAIN SCALES - GENERAL: PAINLEVEL: 0-NO PAIN

## 2024-03-11 ASSESSMENT — COLUMBIA-SUICIDE SEVERITY RATING SCALE - C-SSRS
2. HAVE YOU ACTUALLY HAD ANY THOUGHTS OF KILLING YOURSELF?: NO
1. IN THE PAST MONTH, HAVE YOU WISHED YOU WERE DEAD OR WISHED YOU COULD GO TO SLEEP AND NOT WAKE UP?: NO
6. HAVE YOU EVER DONE ANYTHING, STARTED TO DO ANYTHING, OR PREPARED TO DO ANYTHING TO END YOUR LIFE?: NO

## 2024-03-11 NOTE — PROGRESS NOTES
Cancer synopsis:  Rad/onc: Dr. Gibson/ Lexi CNP  Med/onc: Dr. Arvizu/ Krissy CNP    2010 PSA 10, Prostate Bx negative per patient   2020 Prostate Bx negative per patient      2/2/22: PSA 45. MRI shows 2 lesions at R central zone of prostate     7/12/22: TRUS Prostate bx reveals Prostate Adenocarcinoma. GS 4+4=8, GG4 1/2 cores, 10% of tissue, No LVI     7/26/22: NM Tc 99 bone scan reveals no definitive evidence of  metastatic disease. CT C/A/P without definitive evidence of metastatic disease  8/22/2022: PSMA PET  with hypermetabolic focus on the R prostate gland  (SUVmax 17.4). No ROYAL  or metastatic disease.  9/21/2022: Cycle 1 [facility] 1821  10/11/2022: Abiraterone held due to grade 3 HTN  10/14/2022: Resumed Abiraterone  11/30/2022: Start XRT to prostate (complete 12/9/22)  3/8/2023: Off treatment, completed all 6 cycles on [id number]    History of presenting illness:    Patient ID: [id number]     Clifton Gan is a 63 y.o. male who presents for his high risk prostate cancer GS 4+4=8, IPSA 10 now s/p SBRT to prostate and 6m of intensified systemic therapy per [facility] 1821    RT Site: prostate  RT Date: 11/30/2022  Hormone therapy: Yes, ADT, naraparib and AA/p 6m done 03/08/2023  Hot Flushes: Denies  Fatigue: Denies. Report mood and energy have overall improved now with testerone return.  Bone pain: Admits some mild -moderate pain with urination when not taking flowmax. Reports relief with flowmax.  ASHLEY: 1  ED: Admits to decline in erectile function due to lack of interest but not overall concerned per patient. Does have PDE5i.  ED medications: yes, Viagra has not used yet.  IPSS:   Urinary symptoms: Admits to LUTS including weak stream, frequency and urinary dribble when not taking flowmax BID. Has trailed stopping flowmax but noted that when stopping flowmax or reducing symptoms worsen. Denies dysuria, hematuria or urine leakage.  Urinary Medications: No  Rectal bleeding: Denies  Other systems: Denies SOB, Cp or  fever. Reports recent URI that has resolved.       Review of systems:  Review of Systems   Constitutional:  Negative for fatigue, fever and unexpected weight change.   Respiratory:  Negative for cough, chest tightness and shortness of breath.    Cardiovascular:  Negative for chest pain, palpitations and leg swelling.   Gastrointestinal:  Negative for abdominal pain, anal bleeding, blood in stool, constipation, diarrhea and rectal pain.   Endocrine: Negative for cold intolerance, heat intolerance and polyuria.   Genitourinary:  Negative for decreased urine volume, difficulty urinating, dysuria, frequency, hematuria and urgency.   Musculoskeletal:  Negative for arthralgias, back pain, gait problem and joint swelling.   Skin: Negative.    Allergic/Immunologic: Negative.    Neurological:  Negative for dizziness, syncope and weakness.   Psychiatric/Behavioral: Negative.         Past Medical history  Past Medical History:   Diagnosis Date    Other acute postprocedural pain 05/12/2021    Acute post-operative pain    Personal history of other diseases of the circulatory system 04/02/2021    History of hypertension    Personal history of other specified conditions 10/10/2022    History of palpitations        Surgical/family history  Family History   Problem Relation Name Age of Onset    Prostate cancer Brother        Past Surgical History:   Procedure Laterality Date    OTHER SURGICAL HISTORY  10/10/2022    Umbilical hernia repair        Social History  Tobacco Use: High Risk (3/11/2024)    Patient History     Smoking Tobacco Use: Some Days     Smokeless Tobacco Use: Never     Passive Exposure: Not on file         Current med list:  Current Outpatient Medications   Medication Instructions    amLODIPine (NORVASC) 10 mg, oral, Daily    aspirin 81 mg EC tablet     lisinopril 10 mg, oral, Daily    mv-min/folic/K1/lycopen/lutein (MEN 50 PLUS MULTIVITAMIN ORAL) oral    tamsulosin (FLOMAX) 0.4 mg, oral, 2 times daily        Last  "recorded vital:  /83   Pulse 79   Temp 36 °C (96.8 °F) (Temporal)   Resp 18   Ht 1.778 m (5' 10\")   Wt 96.9 kg (213 lb 9.6 oz)   SpO2 96%   BMI 30.65 kg/m²     Physical exam  Physical Exam  Constitutional:       Appearance: Normal appearance.   Cardiovascular:      Rate and Rhythm: Normal rate.   Pulmonary:      Effort: Pulmonary effort is normal.      Breath sounds: Normal breath sounds.   Musculoskeletal:         General: Normal range of motion.      Cervical back: Normal range of motion.   Neurological:      Mental Status: He is alert and oriented to person, place, and time.   Psychiatric:         Mood and Affect: Mood normal.         Behavior: Behavior normal.         Thought Content: Thought content normal.         Judgment: Judgment normal.       ECOG Performance Status: 0 Fully Active   Karnofsky Score (Age >/ 16 yrs): 100- Normal, No  complaints, no evidence of disease       Pertinent labs:  PSA   Date/Time Value Ref Range Status   03/01/2024 02:51 PM 0.1 0.0 - 4.0 ng/mL Final     Comment:     INTERPRETIVE INFORMATION: Prostate Specific Antigen    The Roche PSA electrochemiluminescent immunoassay is used. Results   obtained with different test methods or kits cannot be used   interchangeably. The Roche PSA method is approved for use as an   aid in the detection of prostate cancer when used in conjunction   with a digital rectal exam in individuals with a prostate age 50   years and older. The Roche PSA is also indicated for the serial   measurement of PSA to aid in the prognosis and management of   prostate cancer patients. Elevated PSA concentrations can only   suggest the presence of prostate cancer until biopsy is performed.   PSA concentrations can also be elevated in benign prostatic   hyperplasia or inflammatory conditions of the prostate. PSA is   generally not elevated in healthy individuals or individuals with   nonprostatic carcinoma.         Dx:  Problem List Items Addressed This " Visit       Prostate cancer (CMS/HCC) - Primary    Relevant Orders    PSA, Total and Free    Testosterone    Comprehensive Metabolic Panel    CBC and Auto Differential   Reviewed PSA of 0.10, stable at this time. Review of latent SE including rectal bleeding, hematuria, urinary strictures, ED where reviewed as well as how to contact office if s/s present. Denies latent SE. NCCN guidelines where reviewed and routine FUV of every 3m for first year and every 6m for four years for a total of five years was discussed. Patient verbalized understanding.     Recommend vitamin D supplementation, start (or increase by) 400-1000 units daily. Reviewed importance of intake while on Testerone lowering therapy as well as after until Testerone re-establishes, at which point may stop if DEXA indicative of normal bone density. Also reviewed that individuals at a higher risk such as those over the age of 75 or those with a hx of bone fx, osteoporosis or osteopenia to continue supplementation indefinitely with routine DEXA imaging as per national guidelines to assess bone health continually.     Discussed elevated SBP. Reports not taking BP medications. Reviewed need to take medication as prescribed d/t risk of small vessel damage. Patient amendable.      PLAN:  FUV 6m  Labs Labs 6m (CBC/CMP/ Psa and testerone)  Imaging none  FUV other providers: PCP for routine evals      Please contact office with any concerns:  Loco Chavez CNP  [phone]

## 2024-03-11 NOTE — RESEARCH NOTES
STUDY: [id number]  VISIT: 12M    Clinical research specialist saw patient in clinic today.     AEs/Con Meds assessed.    QOLs completed by patient without assistance.    Next appointment & contact information given to patient via letter.     All questions answered to patient satisfaction.    Leigh Connelly  Clinical Research Specialist

## 2024-05-14 DIAGNOSIS — I10 ESSENTIAL HYPERTENSION: Chronic | ICD-10-CM

## 2024-05-14 RX ORDER — LISINOPRIL 10 MG/1
10 TABLET ORAL DAILY
Qty: 30 TABLET | Refills: 0 | Status: SHIPPED | OUTPATIENT
Start: 2024-05-14 | End: 2024-06-13

## 2024-05-30 ENCOUNTER — APPOINTMENT (OUTPATIENT)
Dept: CARDIOLOGY | Facility: CLINIC | Age: 64
End: 2024-05-30
Payer: COMMERCIAL

## 2024-06-11 DIAGNOSIS — I10 ESSENTIAL HYPERTENSION: Chronic | ICD-10-CM

## 2024-06-12 RX ORDER — LISINOPRIL 10 MG/1
10 TABLET ORAL DAILY
Qty: 30 TABLET | Refills: 0 | Status: SHIPPED | OUTPATIENT
Start: 2024-06-12 | End: 2024-06-20 | Stop reason: SDUPTHER

## 2024-06-12 RX ORDER — LISINOPRIL 10 MG/1
TABLET ORAL
Qty: 30 TABLET | Refills: 0 | Status: SHIPPED | OUTPATIENT
Start: 2024-06-12

## 2024-06-20 ENCOUNTER — OFFICE VISIT (OUTPATIENT)
Dept: CARDIOLOGY | Facility: CLINIC | Age: 64
End: 2024-06-20
Payer: COMMERCIAL

## 2024-06-20 VITALS
OXYGEN SATURATION: 96 % | TEMPERATURE: 97.7 F | WEIGHT: 214.95 LBS | BODY MASS INDEX: 30.84 KG/M2 | DIASTOLIC BLOOD PRESSURE: 75 MMHG | HEART RATE: 64 BPM | SYSTOLIC BLOOD PRESSURE: 130 MMHG | RESPIRATION RATE: 16 BRPM

## 2024-06-20 DIAGNOSIS — I10 ESSENTIAL HYPERTENSION: Primary | Chronic | ICD-10-CM

## 2024-06-20 DIAGNOSIS — E78.2 MIXED HYPERLIPIDEMIA: Primary | ICD-10-CM

## 2024-06-20 PROCEDURE — 99213 OFFICE O/P EST LOW 20 MIN: CPT | Performed by: NURSE PRACTITIONER

## 2024-06-20 ASSESSMENT — ENCOUNTER SYMPTOMS
OCCASIONAL FEELINGS OF UNSTEADINESS: 0
DEPRESSION: 0
LOSS OF SENSATION IN FEET: 0

## 2024-06-20 ASSESSMENT — PAIN SCALES - GENERAL: PAINLEVEL: 0-NO PAIN

## 2024-06-20 NOTE — PROGRESS NOTES
Chief complaint - CV follow up  HPI:  62 yo CM with HTN, dyslipidemia, CAD, h/o prostate cancer, presenting for follow up.  No specific complaints.  Energy is good, feels well.  Still working.  Denies SOB, chest pain, syncope, +edema but less. No orthopnea, PND.     Full 14 point ROS complete and negative.  Visit Vitals  /75   Pulse 64   Temp 36.5 °C (97.7 °F) (Core)   Resp 16   Wt 97.5 kg (214 lb 15.2 oz)   SpO2 96%   BMI 30.84 kg/m²   Smoking Status Some Days   BSA 2.19 m²     Current Outpatient Medications   Medication Instructions    amLODIPine (NORVASC) 10 mg, oral, Daily    aspirin 81 mg EC tablet     lisinopril 10 mg tablet TAKE 1 TABLET BY MOUTH ONCE DAILY . APPOINTMENT REQUIRED FOR FUTURE REFILLS    mv-min/folic/K1/lycopen/lutein (MEN 50 PLUS MULTIVITAMIN ORAL) oral    tamsulosin (FLOMAX) 0.4 mg, oral, 2 times daily       PE:  alert, NAD  Lungs CTA  Heart RRR S1S2 no m/r/g no jvd  1+ ankle edema bilat  Abd round, nontender  Extrem well perfused, PIERSON = no edema  CACS 2022:    LM: 0.  LAD: 94.  LCx: 0.  RCA: 0.  Total: 94  Lipids:   HDL 64  Trig 202  Trig/HDL 3.15  hgbA1C 4.8    Impressions:  62 yo CM with h/o prostate cancer, HTN now off ADT.  Feeling well.  Last echo normal in 11/2022.  HTN is failry well controlled.  He has elevated 10 year CV risk.  We discussed prioritzing diet to improve lipid profile and lose 10% body weight.  He presently does not want to take a statin despite his elevated risk.  He is taking aspirin.  We discussed the nuance of statins, LDL and the benefits given his known CAD per his CACS.    - continue lisinopril 10mg - if Bps stay above goal, will increase to 20mg  - obtain new fasting lipid panel with apoA/B, fasting insulin   - prioritize whole foods, eliminate processed cereals, breads, pastas and sweets  - lose 10% body weight (20lbs)  - increase activity to most days of the week  - he is going to encourage wife to partner with him on these lifestyle  changes  - return for care

## 2024-07-24 DIAGNOSIS — I10 ESSENTIAL HYPERTENSION: Chronic | ICD-10-CM

## 2024-07-24 RX ORDER — LISINOPRIL 10 MG/1
10 TABLET ORAL DAILY
Qty: 90 TABLET | Refills: 3 | Status: SHIPPED | OUTPATIENT
Start: 2024-07-24 | End: 2025-07-24

## 2024-09-06 ENCOUNTER — TELEPHONE (OUTPATIENT)
Dept: RADIATION ONCOLOGY | Facility: HOSPITAL | Age: 64
End: 2024-09-06
Payer: COMMERCIAL

## 2024-09-06 NOTE — TELEPHONE ENCOUNTER
Called pt. to remind of appointment on 9/10/2024 at 2:30 pm with MONA Elliott. Pt's phone went to voicemail left number if needs to reschedule.

## 2024-09-10 ENCOUNTER — APPOINTMENT (OUTPATIENT)
Dept: RADIATION ONCOLOGY | Facility: HOSPITAL | Age: 64
End: 2024-09-10
Payer: MEDICARE

## 2024-09-12 ENCOUNTER — TELEPHONE (OUTPATIENT)
Dept: RADIATION ONCOLOGY | Facility: HOSPITAL | Age: 64
End: 2024-09-12
Payer: COMMERCIAL

## 2024-09-16 ENCOUNTER — HOSPITAL ENCOUNTER (OUTPATIENT)
Dept: RADIATION ONCOLOGY | Facility: HOSPITAL | Age: 64
Setting detail: RADIATION/ONCOLOGY SERIES
Discharge: HOME | End: 2024-09-16
Payer: MEDICARE

## 2024-09-16 ENCOUNTER — DOCUMENTATION (OUTPATIENT)
Dept: RADIATION ONCOLOGY | Facility: HOSPITAL | Age: 64
End: 2024-09-16
Payer: MEDICARE

## 2024-09-16 VITALS
DIASTOLIC BLOOD PRESSURE: 79 MMHG | SYSTOLIC BLOOD PRESSURE: 123 MMHG | TEMPERATURE: 97.2 F | WEIGHT: 206.3 LBS | RESPIRATION RATE: 18 BRPM | BODY MASS INDEX: 29.6 KG/M2 | HEART RATE: 68 BPM | OXYGEN SATURATION: 96 %

## 2024-09-16 DIAGNOSIS — C61 PROSTATE CANCER (MULTI): Primary | ICD-10-CM

## 2024-09-16 DIAGNOSIS — E78.2 MIXED HYPERLIPIDEMIA: ICD-10-CM

## 2024-09-16 PROCEDURE — 99214 OFFICE O/P EST MOD 30 MIN: CPT

## 2024-09-16 ASSESSMENT — ENCOUNTER SYMPTOMS
ARTHRALGIAS: 0
DIZZINESS: 0
DIFFICULTY URINATING: 0
BACK PAIN: 0
HEMATURIA: 0
DIARRHEA: 0
RECTAL PAIN: 0
FEVER: 0
SHORTNESS OF BREATH: 0
OCCASIONAL FEELINGS OF UNSTEADINESS: 0
PALPITATIONS: 0
JOINT SWELLING: 0
LOSS OF SENSATION IN FEET: 0
CONSTIPATION: 0
FREQUENCY: 0
UNEXPECTED WEIGHT CHANGE: 0
ANAL BLEEDING: 0
DEPRESSION: 0
BLOOD IN STOOL: 0
DYSURIA: 0
ABDOMINAL PAIN: 0
PSYCHIATRIC NEGATIVE: 1
CHEST TIGHTNESS: 0
FATIGUE: 0
WEAKNESS: 0
COUGH: 0
ALLERGIC/IMMUNOLOGIC NEGATIVE: 1

## 2024-09-16 ASSESSMENT — PAIN SCALES - GENERAL: PAINLEVEL: 0-NO PAIN

## 2024-09-16 ASSESSMENT — COLUMBIA-SUICIDE SEVERITY RATING SCALE - C-SSRS
1. IN THE PAST MONTH, HAVE YOU WISHED YOU WERE DEAD OR WISHED YOU COULD GO TO SLEEP AND NOT WAKE UP?: NO
6. HAVE YOU EVER DONE ANYTHING, STARTED TO DO ANYTHING, OR PREPARED TO DO ANYTHING TO END YOUR LIFE?: NO
2. HAVE YOU ACTUALLY HAD ANY THOUGHTS OF KILLING YOURSELF?: NO

## 2024-09-16 ASSESSMENT — PATIENT HEALTH QUESTIONNAIRE - PHQ9
SUM OF ALL RESPONSES TO PHQ9 QUESTIONS 1 AND 2: 0
1. LITTLE INTEREST OR PLEASURE IN DOING THINGS: NOT AT ALL
2. FEELING DOWN, DEPRESSED OR HOPELESS: NOT AT ALL

## 2024-09-16 NOTE — PROGRESS NOTES
Cancer synopsis:  Rad/onc: Dr. Gibson/ Lexi CNP  Med/onc: Dr. Arvizu/ Krissy CNP    2010 PSA 10, Prostate Bx negative per patient   2020 Prostate Bx negative per patient      2/2/22: PSA 45. MRI shows 2 lesions at R central zone of prostate     7/12/22: TRUS Prostate bx reveals Prostate Adenocarcinoma. GS 4+4=8, GG4 1/2 cores, 10% of tissue, No LVI     7/26/22: NM Tc 99 bone scan reveals no definitive evidence of  metastatic disease. CT C/A/P without definitive evidence of metastatic disease  8/22/2022: PSMA PET  with hypermetabolic focus on the R prostate gland  (SUVmax 17.4). No ROYAL  or metastatic disease.  9/21/2022: Cycle 1 [facility] 1821  10/11/2022: Abiraterone held due to grade 3 HTN  10/14/2022: Resumed Abiraterone  11/30/2022: Start XRT to prostate (complete 12/9/22)  3/8/2023: Off treatment, completed all 6 cycles on [id number]    History of presenting illness:    Patient ID: [id number]     Clifton Gan is a 64 y.o. male who presents for his high risk prostate cancer GS 4+4=8, IPSA 10 now s/p SBRT to prostate and 6m of intensified systemic therapy per [facility] 1821    RT Site: prostate  RT Date: 11/30/2022  Hormone therapy: Yes, ADT, naraparib and AA/p 6m done 03/08/2023  Hot Flushes: Denies  Fatigue: Denies.  Bone pain: Admits some mild -moderate pain with urination when not taking flowmax. Reports relief with flowmax. Was about the same prior to RT per patient.  ED: Admits to decline in erectile function due to lack of interest but not overall concerned per patient. Does have PDE5i.  ED medications: yes, Viagra has not used yet.  IPSS:   Urinary symptoms: Admits to LUTS including weak stream, frequency and urinary dribble when not taking flowmax BID. Has trailed stopping flowmax but noted that when stopping flowmax or reducing symptoms worsen. Denies dysuria, hematuria or urine leakage. Using flomax once to twice a day. States about the same as it was prior to RT  Urinary Medications: flowmax BID  Rectal  bleeding: Denies  Colonoscopy: none on file  Other systems: Denies SOB, Cp or fever.    Review of systems:  Review of Systems   Constitutional:  Negative for fatigue, fever and unexpected weight change.   Respiratory:  Negative for cough, chest tightness and shortness of breath.    Cardiovascular:  Negative for chest pain, palpitations and leg swelling.   Gastrointestinal:  Negative for abdominal pain, anal bleeding, blood in stool, constipation, diarrhea and rectal pain.   Endocrine: Negative for cold intolerance, heat intolerance and polyuria.   Genitourinary:  Negative for decreased urine volume, difficulty urinating, dysuria, frequency, hematuria and urgency.   Musculoskeletal:  Negative for arthralgias, back pain, gait problem and joint swelling.   Skin: Negative.    Allergic/Immunologic: Negative.    Neurological:  Negative for dizziness, syncope and weakness.   Psychiatric/Behavioral: Negative.       Past Medical history  Past Medical History:   Diagnosis Date    Other acute postprocedural pain 05/12/2021    Acute post-operative pain    Personal history of other diseases of the circulatory system 04/02/2021    History of hypertension    Personal history of other specified conditions 10/10/2022    History of palpitations      Surgical/family history  Family History   Problem Relation Name Age of Onset    Prostate cancer Brother        Past Surgical History:   Procedure Laterality Date    OTHER SURGICAL HISTORY  10/10/2022    Umbilical hernia repair      Social History  Tobacco Use: High Risk (9/16/2024)    Patient History     Smoking Tobacco Use: Some Days     Smokeless Tobacco Use: Never     Passive Exposure: Not on file       Current med list:  Current Outpatient Medications   Medication Instructions    amLODIPine (NORVASC) 10 mg, oral, Daily    aspirin 81 mg EC tablet     lisinopril 10 mg, oral, Daily    mv-min/folic/K1/lycopen/lutein (MEN 50 PLUS MULTIVITAMIN ORAL) oral      Last recorded vital:  /79    Pulse 68   Temp 36.2 °C (97.2 °F) (Temporal)   Resp 18   Wt 93.6 kg (206 lb 4.8 oz)   SpO2 96%   BMI 29.60 kg/m²     Physical exam  Physical Exam  Constitutional:       Appearance: Normal appearance.   Cardiovascular:      Rate and Rhythm: Normal rate.   Pulmonary:      Effort: Pulmonary effort is normal.      Breath sounds: Normal breath sounds.   Musculoskeletal:         General: Normal range of motion.      Cervical back: Normal range of motion.   Neurological:      Mental Status: He is alert and oriented to person, place, and time.   Psychiatric:         Mood and Affect: Mood normal.         Behavior: Behavior normal.         Thought Content: Thought content normal.         Judgment: Judgment normal.       ECOG Performance Status: 0 Fully Active   Karnofsky Score (Age >/ 16 yrs): 100- Normal, No  complaints, no evidence of disease     Pertinent labs:  PSA   Date/Time Value Ref Range Status   03/01/2024 02:51 PM 0.1 0.0 - 4.0 ng/mL Final     Comment:     INTERPRETIVE INFORMATION: Prostate Specific Antigen    The Roche PSA electrochemiluminescent immunoassay is used. Results   obtained with different test methods or kits cannot be used   interchangeably. The Roche PSA method is approved for use as an   aid in the detection of prostate cancer when used in conjunction   with a digital rectal exam in individuals with a prostate age 50   years and older. The Roche PSA is also indicated for the serial   measurement of PSA to aid in the prognosis and management of   prostate cancer patients. Elevated PSA concentrations can only   suggest the presence of prostate cancer until biopsy is performed.   PSA concentrations can also be elevated in benign prostatic   hyperplasia or inflammatory conditions of the prostate. PSA is   generally not elevated in healthy individuals or individuals with   nonprostatic carcinoma.     Dx:  Problem List Items Addressed This Visit       Prostate cancer (Multi) - Primary   Due for  PSA today will call with results. Review of latent SE including rectal bleeding, hematuria, urinary strictures, ED where reviewed as well as how to contact office if s/s present. Denies latent SE. NCCN guidelines where reviewed and routine FUV of every 3m for first year and every 6m for four years for a total of five years was discussed. Patient verbalized understanding.     Recommend vitamin D supplementation, start (or increase by) 400-1000 units daily. Reviewed importance of intake while on Testerone lowering therapy as well as after until Testerone re-establishes, at which point may stop if DEXA indicative of normal bone density. Also reviewed that individuals at a higher risk such as those over the age of 75 or those with a hx of bone fx, osteoporosis or osteopenia to continue supplementation indefinitely with routine DEXA imaging as per national guidelines to assess bone health continually.     Discussed 2yr bx for [id number] patient amendable and research coordinator lena malloy to arrange with zbigniew gates urologist whom did previous bx's.    PLAN:  FUV 6m per trial  Labs Labs today and in 6m (CBC/CMP/ Psa and testerone), cholesterol and triglicerides added per patients request  Imaging none  Screening: Colonoscopy due now  Reduce cigar usage/stop smoking  FUV other providers: PCP for routine evals    Please contact office with any concerns:  Loco Chavez CNP  [phone]

## 2024-09-16 NOTE — RESEARCH NOTES
STUDY: [id number]  VISIT: 18M [facility]    Clinical Research Specialist saw patient in clinic today.    Adverse Events and Concomitant Medications assessed.    Biopsy discussed for next visit.    Next appointment and contact information provided.    All questions answered to patient satisfaction.    Leigh Connelly  09/16/2024

## 2024-09-17 ENCOUNTER — LAB (OUTPATIENT)
Dept: LAB | Facility: LAB | Age: 64
End: 2024-09-17
Payer: MEDICARE

## 2024-09-17 DIAGNOSIS — C61 PROSTATE CANCER (MULTI): ICD-10-CM

## 2024-09-17 DIAGNOSIS — C61 MALIGNANT NEOPLASM OF PROSTATE (MULTI): ICD-10-CM

## 2024-09-17 DIAGNOSIS — E78.2 MIXED HYPERLIPIDEMIA: ICD-10-CM

## 2024-09-17 LAB
ALBUMIN SERPL BCP-MCNC: 4.1 G/DL (ref 3.4–5)
ALP SERPL-CCNC: 51 U/L (ref 33–136)
ALT SERPL W P-5'-P-CCNC: 15 U/L (ref 10–52)
ANION GAP SERPL CALC-SCNC: 9 MMOL/L (ref 10–20)
AST SERPL W P-5'-P-CCNC: 15 U/L (ref 9–39)
BASOPHILS # BLD AUTO: 0.03 X10*3/UL (ref 0–0.1)
BASOPHILS NFR BLD AUTO: 0.7 %
BILIRUB SERPL-MCNC: 0.6 MG/DL (ref 0–1.2)
BUN SERPL-MCNC: 15 MG/DL (ref 6–23)
CALCIUM SERPL-MCNC: 8.6 MG/DL (ref 8.6–10.3)
CHLORIDE SERPL-SCNC: 103 MMOL/L (ref 98–107)
CO2 SERPL-SCNC: 29 MMOL/L (ref 21–32)
CREAT SERPL-MCNC: 0.8 MG/DL (ref 0.5–1.3)
EGFRCR SERPLBLD CKD-EPI 2021: >90 ML/MIN/1.73M*2
EOSINOPHIL # BLD AUTO: 0.09 X10*3/UL (ref 0–0.7)
EOSINOPHIL NFR BLD AUTO: 2 %
ERYTHROCYTE [DISTWIDTH] IN BLOOD BY AUTOMATED COUNT: 13 % (ref 11.5–14.5)
GLUCOSE SERPL-MCNC: 92 MG/DL (ref 74–99)
HCT VFR BLD AUTO: 40.1 % (ref 41–52)
HGB BLD-MCNC: 13.3 G/DL (ref 13.5–17.5)
IMM GRANULOCYTES # BLD AUTO: 0.02 X10*3/UL (ref 0–0.7)
IMM GRANULOCYTES NFR BLD AUTO: 0.4 % (ref 0–0.9)
INSULIN P FAST SERPL-ACNC: 3 UIU/ML (ref 3–25)
LDLC SERPL DIRECT ASSAY-MCNC: 141 MG/DL (ref 0–129)
LYMPHOCYTES # BLD AUTO: 0.8 X10*3/UL (ref 1.2–4.8)
LYMPHOCYTES NFR BLD AUTO: 17.6 %
MCH RBC QN AUTO: 30.4 PG (ref 26–34)
MCHC RBC AUTO-ENTMCNC: 33.2 G/DL (ref 32–36)
MCV RBC AUTO: 92 FL (ref 80–100)
MONOCYTES # BLD AUTO: 0.52 X10*3/UL (ref 0.1–1)
MONOCYTES NFR BLD AUTO: 11.4 %
NEUTROPHILS # BLD AUTO: 3.09 X10*3/UL (ref 1.2–7.7)
NEUTROPHILS NFR BLD AUTO: 67.9 %
NRBC BLD-RTO: 0 /100 WBCS (ref 0–0)
PLATELET # BLD AUTO: 149 X10*3/UL (ref 150–450)
POTASSIUM SERPL-SCNC: 4.3 MMOL/L (ref 3.5–5.3)
PROT SERPL-MCNC: 6.3 G/DL (ref 6.4–8.2)
RBC # BLD AUTO: 4.37 X10*6/UL (ref 4.5–5.9)
SODIUM SERPL-SCNC: 137 MMOL/L (ref 136–145)
TRIGL SERPL-MCNC: 74 MG/DL (ref 0–149)
WBC # BLD AUTO: 4.6 X10*3/UL (ref 4.4–11.3)

## 2024-09-19 DIAGNOSIS — C61 MALIGNANT NEOPLASM OF PROSTATE (MULTI): Primary | ICD-10-CM

## 2024-09-19 LAB
PSA SERPL-MCNC: 0.24 NG/ML
TESTOST SERPL-MCNC: 486 NG/DL (ref 240–1000)

## 2024-09-20 LAB
APO A-I SERPL-MCNC: 158 MG/DL (ref 104–202)
APO B/APO A-I SERPL: 0.7 {RATIO}
APO B100 SERPL-MCNC: 103 MG/DL (ref 66–133)

## 2024-12-26 ENCOUNTER — APPOINTMENT (OUTPATIENT)
Dept: CARDIOLOGY | Facility: CLINIC | Age: 64
End: 2024-12-26
Payer: COMMERCIAL

## 2025-01-27 DIAGNOSIS — R39.12 WEAK URINARY STREAM: Primary | ICD-10-CM

## 2025-01-27 RX ORDER — TAMSULOSIN HYDROCHLORIDE 0.4 MG/1
0.4 CAPSULE ORAL
Qty: 30 CAPSULE | Refills: 5 | Status: SHIPPED | OUTPATIENT
Start: 2025-01-27 | End: 2025-07-26

## 2025-02-12 DIAGNOSIS — I10 PRIMARY HYPERTENSION: ICD-10-CM

## 2025-02-12 RX ORDER — AMLODIPINE BESYLATE 10 MG/1
10 TABLET ORAL DAILY
Qty: 360 TABLET | Refills: 0 | Status: SHIPPED | OUTPATIENT
Start: 2025-02-12

## 2025-02-28 ENCOUNTER — TELEPHONE (OUTPATIENT)
Dept: RADIATION ONCOLOGY | Facility: HOSPITAL | Age: 65
End: 2025-02-28

## 2025-02-28 ENCOUNTER — TELEPHONE (OUTPATIENT)
Dept: RADIATION ONCOLOGY | Facility: HOSPITAL | Age: 65
End: 2025-02-28
Payer: MEDICARE

## 2025-02-28 ENCOUNTER — LAB (OUTPATIENT)
Dept: LAB | Facility: HOSPITAL | Age: 65
End: 2025-02-28
Payer: MEDICARE

## 2025-02-28 DIAGNOSIS — C61 PROSTATE CANCER (MULTI): ICD-10-CM

## 2025-02-28 DIAGNOSIS — C61 MALIGNANT NEOPLASM OF PROSTATE (MULTI): Primary | ICD-10-CM

## 2025-02-28 LAB
ALBUMIN SERPL BCP-MCNC: 4.2 G/DL (ref 3.4–5)
ALP SERPL-CCNC: 45 U/L (ref 33–136)
ALT SERPL W P-5'-P-CCNC: 17 U/L (ref 10–52)
ANION GAP SERPL CALC-SCNC: 13 MMOL/L (ref 10–20)
AST SERPL W P-5'-P-CCNC: 15 U/L (ref 9–39)
BASOPHILS # BLD AUTO: 0.03 X10*3/UL (ref 0–0.1)
BASOPHILS NFR BLD AUTO: 0.7 %
BILIRUB SERPL-MCNC: 0.3 MG/DL (ref 0–1.2)
BUN SERPL-MCNC: 19 MG/DL (ref 6–23)
CALCIUM SERPL-MCNC: 8.6 MG/DL (ref 8.6–10.3)
CHLORIDE SERPL-SCNC: 105 MMOL/L (ref 98–107)
CO2 SERPL-SCNC: 24 MMOL/L (ref 21–32)
CREAT SERPL-MCNC: 0.81 MG/DL (ref 0.5–1.3)
EGFRCR SERPLBLD CKD-EPI 2021: >90 ML/MIN/1.73M*2
EOSINOPHIL # BLD AUTO: 0.11 X10*3/UL (ref 0–0.7)
EOSINOPHIL NFR BLD AUTO: 2.6 %
ERYTHROCYTE [DISTWIDTH] IN BLOOD BY AUTOMATED COUNT: 12.8 % (ref 11.5–14.5)
GLUCOSE SERPL-MCNC: 88 MG/DL (ref 74–99)
HCT VFR BLD AUTO: 39.8 % (ref 41–52)
HGB BLD-MCNC: 13.5 G/DL (ref 13.5–17.5)
IMM GRANULOCYTES # BLD AUTO: 0.01 X10*3/UL (ref 0–0.7)
IMM GRANULOCYTES NFR BLD AUTO: 0.2 % (ref 0–0.9)
LYMPHOCYTES # BLD AUTO: 0.74 X10*3/UL (ref 1.2–4.8)
LYMPHOCYTES NFR BLD AUTO: 17.2 %
MCH RBC QN AUTO: 30.7 PG (ref 26–34)
MCHC RBC AUTO-ENTMCNC: 33.9 G/DL (ref 32–36)
MCV RBC AUTO: 91 FL (ref 80–100)
MONOCYTES # BLD AUTO: 0.47 X10*3/UL (ref 0.1–1)
MONOCYTES NFR BLD AUTO: 10.9 %
NEUTROPHILS # BLD AUTO: 2.94 X10*3/UL (ref 1.2–7.7)
NEUTROPHILS NFR BLD AUTO: 68.4 %
NRBC BLD-RTO: 0 /100 WBCS (ref 0–0)
PLATELET # BLD AUTO: 164 X10*3/UL (ref 150–450)
POTASSIUM SERPL-SCNC: 4.3 MMOL/L (ref 3.5–5.3)
PROT SERPL-MCNC: 6.1 G/DL (ref 6.4–8.2)
RBC # BLD AUTO: 4.4 X10*6/UL (ref 4.5–5.9)
SODIUM SERPL-SCNC: 138 MMOL/L (ref 136–145)
WBC # BLD AUTO: 4.3 X10*3/UL (ref 4.4–11.3)

## 2025-02-28 ASSESSMENT — ENCOUNTER SYMPTOMS
PALPITATIONS: 0
FREQUENCY: 0
JOINT SWELLING: 0
RECTAL PAIN: 0
PSYCHIATRIC NEGATIVE: 1
ARTHRALGIAS: 0
FATIGUE: 0
DIZZINESS: 0
DIFFICULTY URINATING: 0
HEMATURIA: 0
WEAKNESS: 0
CONSTIPATION: 0
UNEXPECTED WEIGHT CHANGE: 0
SHORTNESS OF BREATH: 0
COUGH: 0
CHEST TIGHTNESS: 0
FEVER: 0
ABDOMINAL PAIN: 0
ANAL BLEEDING: 0
ALLERGIC/IMMUNOLOGIC NEGATIVE: 1
BACK PAIN: 0
DYSURIA: 0
DIARRHEA: 0
BLOOD IN STOOL: 0

## 2025-02-28 NOTE — PROGRESS NOTES
Cancer synopsis:  Rad/onc: Dr. Gibson/ Lexi CNP  Med/onc: Dr. Arvizu/ Krissy CNP    2010 PSA 10, Prostate Bx negative per patient   2020 Prostate Bx negative per patient      2/2/22: PSA 45. MRI shows 2 lesions at R central zone of prostate     7/12/22: TRUS Prostate bx reveals Prostate Adenocarcinoma. GS 4+4=8, GG4 1/2 cores, 10% of tissue, No LVI     7/26/22: NM Tc 99 bone scan reveals no definitive evidence of  metastatic disease. CT C/A/P without definitive evidence of metastatic disease  8/22/2022: PSMA PET  with hypermetabolic focus on the R prostate gland  (SUVmax 17.4). No ROYAL  or metastatic disease.  9/21/2022: Cycle 1 [facility] 1821  10/11/2022: Abiraterone held due to grade 3 HTN  10/14/2022: Resumed Abiraterone  11/30/2022: Start XRT to prostate (complete 12/9/22)  3/8/2023: Off treatment, completed all 6 cycles on [id number]    History of presenting illness:    Patient ID: [id number]     Clifton Gan is a 64 y.o. male who presents for his high risk prostate cancer GS 4+4=8, IPSA 10 now s/p SBRT to prostate and 6m of intensified systemic therapy per [facility] 1821    RT Site: prostate  RT Date: 11/30/2022  Hormone therapy: Yes, ADT, naraparib and AA/p 6m done 03/08/2023  Hot Flushes: Denies  Fatigue: Denies.  Bone pain: Denies  ED: Admits to decline in erectile function due to lack of interest but not overall concerned per patient. Does have PDE5i.  ED medications: yes, Viagra has not used yet.  IPSS:   Urinary symptoms: Admits to LUTS including weak stream, frequency and urinary dribble when not taking flowmax BID. Has trailed stopping flowmax but noted that when stopping flowmax or reducing symptoms worsen. Denies dysuria, hematuria or urine leakage. Using flomax once to twice a day. States about the same as it was prior to RT. Had tried to wean down but reports s/s return.  Urinary Medications: flowmax BID  Rectal bleeding: Denies  Colonoscopy: 10/04/2020: polyps removed next in five years (2025)  Other  systems: Denies SOB, Cp or fever.    Review of systems:  Review of Systems   Constitutional:  Negative for fatigue, fever and unexpected weight change.   Respiratory:  Negative for cough, chest tightness and shortness of breath.    Cardiovascular:  Negative for chest pain, palpitations and leg swelling.   Gastrointestinal:  Negative for abdominal pain, anal bleeding, blood in stool, constipation, diarrhea and rectal pain.   Endocrine: Negative for cold intolerance, heat intolerance and polyuria.   Genitourinary:  Negative for decreased urine volume, difficulty urinating, dysuria, frequency, hematuria and urgency.   Musculoskeletal:  Negative for arthralgias, back pain, gait problem and joint swelling.   Skin: Negative.    Allergic/Immunologic: Negative.    Neurological:  Negative for dizziness, syncope and weakness.   Psychiatric/Behavioral: Negative.       Past Medical history  Past Medical History:   Diagnosis Date    Other acute postprocedural pain 05/12/2021    Acute post-operative pain    Personal history of other diseases of the circulatory system 04/02/2021    History of hypertension    Personal history of other specified conditions 10/10/2022    History of palpitations      Surgical/family history  Family History   Problem Relation Name Age of Onset    Prostate cancer Brother        Past Surgical History:   Procedure Laterality Date    OTHER SURGICAL HISTORY  10/10/2022    Umbilical hernia repair      Social History  Tobacco Use: High Risk (9/16/2024)    Patient History     Smoking Tobacco Use: Some Days     Smokeless Tobacco Use: Never     Passive Exposure: Not on file       Current med list:  Current Outpatient Medications   Medication Instructions    amLODIPine (NORVASC) 10 mg, oral, Daily    aspirin 81 mg EC tablet     lisinopril 10 mg, oral, Daily    mv-min/folic/K1/lycopen/lutein (MEN 50 PLUS MULTIVITAMIN ORAL) oral    tamsulosin (FLOMAX) 0.8 mg, oral, Daily before breakfast, Take one capsule by mouth  daily for urinary stream.      Last recorded vital:  /84   Pulse 66   Temp 36.4 °C (97.5 °F) (Temporal)   Resp 18   Wt 92.3 kg (203 lb 6.4 oz)   SpO2 96%   BMI 29.18 kg/m²     Physical Exam  Constitutional:       Appearance: Normal appearance.   Cardiovascular:      Rate and Rhythm: Normal rate.   Pulmonary:      Effort: Pulmonary effort is normal.      Breath sounds: Normal breath sounds.   Musculoskeletal:         General: Normal range of motion.      Cervical back: Normal range of motion.   Neurological:      Mental Status: He is alert and oriented to person, place, and time.   Psychiatric:         Mood and Affect: Mood normal.         Behavior: Behavior normal.         Thought Content: Thought content normal.         Judgment: Judgment normal.       ECOG Performance Status: 0 Fully Active   Karnofsky Score (Age >/ 16 yrs): 100- Normal, No  complaints, no evidence of disease     Pertinent labs:  Prostate Specific AG   Date/Time Value Ref Range Status   09/17/2024 07:56 AM 0.24 <=4.00 ng/mL Final     PSA   Date/Time Value Ref Range Status   02/28/2025 08:01 AM 0.2 0.0 - 4.0 ng/mL Final     Comment:     INTERPRETIVE INFORMATION: Prostate Specific Antigen    The Roche PSA electrochemiluminescent immunoassay is used. Results   obtained with different test methods or kits cannot be used   interchangeably. The Roche PSA method is approved for use as an   aid in the detection of prostate cancer when used in conjunction   with a digital rectal exam in individuals with a prostate age 50   years and older. The Roche PSA is also indicated for the serial   measurement of PSA to aid in the prognosis and management of   prostate cancer patients. Elevated PSA concentrations can only   suggest the presence of prostate cancer until biopsy is performed.   PSA concentrations can also be elevated in benign prostatic   hyperplasia or inflammatory conditions of the prostate. PSA is   generally not elevated in healthy  individuals or individuals with   nonprostatic carcinoma.     Dx:  Problem List Items Addressed This Visit       Prostate cancer (Multi) - Primary    Relevant Orders    Research collection: 6mL Lavender EDTA - Clinical Collect    Research collection: 4mL Lavender EDTA - Clinical Collect    Research collection: 4mL Lavender EDTA - Clinical Collect    Research collection: 6mL Lavender EDTA - Clinical Collect    Research collection: 10mL Streck - Clinical Collect    Research collection: 10mL Streck - Clinical Collect    PSA, Total and Free    Testosterone    Comprehensive Metabolic Panel    CBC and Auto Differential     Other Visit Diagnoses       Weak urinary stream        Relevant Medications    tamsulosin (Flomax) 0.4 mg 24 hr capsule        Reviewed PSA of 0.20 stable at this time. Testosterone normal. Review of latent SE including rectal bleeding, hematuria, urinary strictures, ED where reviewed as well as how to contact office if s/s present. Denies latent SE. NCCN guidelines where reviewed and routine FUV of every 3m for first year and every 6m for four years for a total of five years was discussed. Patient verbalized understanding.     Recommend vitamin D supplementation, start (or increase by) 400-1000 units daily. Reviewed importance of intake while on Testerone lowering therapy as well as after until Testerone re-establishes, at which point may stop if DEXA indicative of normal bone density. Also reviewed that individuals at a higher risk such as those over the age of 75 or those with a hx of bone fx, osteoporosis or osteopenia to continue supplementation indefinitely with routine DEXA imaging as per national guidelines to assess bone health continually.    Discussed 2yr bx with patient per [facility] trial again. Discussed as this is not SOC and there is no indication of disease recurrence. Declined      PLAN:  FUV 6m per trial  Labs Labs today and in 6m (CBC/CMP/ Psa and testerone)  Imaging none  Screening:  Colonoscopy due this year  Reduce cigar usage/stop smoking  FUV other providers: PCP for routine evals    Please contact office with any concerns:  Loco Chavez CNP  [phone]

## 2025-02-28 NOTE — TELEPHONE ENCOUNTER
Called pt. to remind of appointment on 3/3/2025 at 11:30 am  with MONA Elliott. Pt's phone went to voicemail left number if needs to reschedule.

## 2025-03-01 LAB
CHOLEST SERPL-MCNC: 216 MG/DL
CHOLEST/HDLC SERPL: 3 (CALC)
HDLC SERPL-MCNC: 73 MG/DL
LDLC SERPL CALC-MCNC: 124 MG/DL (CALC)
NONHDLC SERPL-MCNC: 143 MG/DL (CALC)
TESTOST SERPL-MCNC: 334 NG/DL (ref 240–1000)
TRIGL SERPL-MCNC: 86 MG/DL

## 2025-03-03 ENCOUNTER — HOSPITAL ENCOUNTER (OUTPATIENT)
Dept: RADIATION ONCOLOGY | Facility: HOSPITAL | Age: 65
Setting detail: RADIATION/ONCOLOGY SERIES
Discharge: HOME | End: 2025-03-03
Payer: MEDICARE

## 2025-03-03 ENCOUNTER — DOCUMENTATION (OUTPATIENT)
Dept: RADIATION ONCOLOGY | Facility: HOSPITAL | Age: 65
End: 2025-03-03

## 2025-03-03 ENCOUNTER — LAB (OUTPATIENT)
Dept: LAB | Facility: HOSPITAL | Age: 65
End: 2025-03-03
Payer: MEDICARE

## 2025-03-03 VITALS
HEART RATE: 66 BPM | BODY MASS INDEX: 29.18 KG/M2 | DIASTOLIC BLOOD PRESSURE: 84 MMHG | SYSTOLIC BLOOD PRESSURE: 136 MMHG | RESPIRATION RATE: 18 BRPM | TEMPERATURE: 97.5 F | OXYGEN SATURATION: 96 % | WEIGHT: 203.4 LBS

## 2025-03-03 DIAGNOSIS — Z00.6 CLINICAL TRIAL PARTICIPANT: ICD-10-CM

## 2025-03-03 DIAGNOSIS — C61 PROSTATE CANCER (MULTI): Primary | ICD-10-CM

## 2025-03-03 DIAGNOSIS — C61 PROSTATE CANCER (MULTI): ICD-10-CM

## 2025-03-03 DIAGNOSIS — R39.12 WEAK URINARY STREAM: ICD-10-CM

## 2025-03-03 LAB
HOLD SPECIMEN: NORMAL
PSA FREE MFR SERPL: <50 %
PSA FREE SERPL-MCNC: <0.1 NG/ML
PSA SERPL IA-MCNC: 0.2 NG/ML (ref 0–4)

## 2025-03-03 PROCEDURE — 36415 COLL VENOUS BLD VENIPUNCTURE: CPT

## 2025-03-03 PROCEDURE — 99214 OFFICE O/P EST MOD 30 MIN: CPT

## 2025-03-03 RX ORDER — TAMSULOSIN HYDROCHLORIDE 0.4 MG/1
0.8 CAPSULE ORAL
Qty: 60 CAPSULE | Refills: 1 | Status: SHIPPED | OUTPATIENT
Start: 2025-03-03 | End: 2025-03-06 | Stop reason: SDUPTHER

## 2025-03-03 ASSESSMENT — ENCOUNTER SYMPTOMS
DEPRESSION: 0
OCCASIONAL FEELINGS OF UNSTEADINESS: 0
LOSS OF SENSATION IN FEET: 0

## 2025-03-03 ASSESSMENT — PAIN SCALES - GENERAL: PAINLEVEL_OUTOF10: 0-NO PAIN

## 2025-03-03 NOTE — RESEARCH NOTES
STUDY: [id number]  VISIT: 24M    Clinical Research Specialist saw patient in clinic today.    Adverse Events and Concomitant Medications assessed.    QOLs completed by patient without assistance.     Patient declines biopsy at this time.    Next appointment and contact information provided.    All questions answered to patient satisfaction.    Leigh Connelly  03/03/2025

## 2025-03-03 NOTE — RESEARCH NOTES
STUDY: [id number]  VISIT: 24M     Clinical Research Specialist saw patient in clinic today.     Adverse Events and Concomitant Medications assessed.     QOLs completed by patient without assistance.      Patient declines biopsy at this time.     Next appointment and contact information provided.     All questions answered to patient satisfaction.     Leigh Connelly  03/03/2025

## 2025-03-06 DIAGNOSIS — R39.12 WEAK URINARY STREAM: ICD-10-CM

## 2025-03-06 RX ORDER — TAMSULOSIN HYDROCHLORIDE 0.4 MG/1
0.8 CAPSULE ORAL DAILY
Qty: 60 CAPSULE | Refills: 1 | Status: SHIPPED | OUTPATIENT
Start: 2025-03-06 | End: 2025-05-05

## 2025-03-17 ENCOUNTER — APPOINTMENT (OUTPATIENT)
Dept: PRIMARY CARE | Facility: CLINIC | Age: 65
End: 2025-03-17
Payer: MEDICARE

## 2025-04-16 DIAGNOSIS — R39.12 WEAK URINARY STREAM: ICD-10-CM

## 2025-04-16 RX ORDER — TAMSULOSIN HYDROCHLORIDE 0.4 MG/1
0.8 CAPSULE ORAL DAILY
Qty: 180 CAPSULE | Refills: 1 | Status: SHIPPED | OUTPATIENT
Start: 2025-04-16

## 2025-04-22 ENCOUNTER — TELEPHONE (OUTPATIENT)
Dept: PRIMARY CARE | Facility: CLINIC | Age: 65
End: 2025-04-22
Payer: MEDICARE

## 2025-04-22 DIAGNOSIS — J11.1 INFLUENZA: Primary | ICD-10-CM

## 2025-04-22 RX ORDER — OSELTAMIVIR PHOSPHATE 75 MG/1
75 CAPSULE ORAL 2 TIMES DAILY
Qty: 10 CAPSULE | Refills: 0 | Status: SHIPPED | OUTPATIENT
Start: 2025-04-22 | End: 2025-04-27

## 2025-04-22 NOTE — TELEPHONE ENCOUNTER
He is asking for a prescription for tamiflu.  He said his son was diagnosed with Flu A a few days ago and he has started to experience some sx of chest congestion.

## 2025-06-02 ENCOUNTER — OFFICE VISIT (OUTPATIENT)
Dept: PRIMARY CARE | Facility: CLINIC | Age: 65
End: 2025-06-02
Payer: MEDICARE

## 2025-06-02 VITALS
HEART RATE: 68 BPM | BODY MASS INDEX: 28.18 KG/M2 | DIASTOLIC BLOOD PRESSURE: 72 MMHG | WEIGHT: 196.4 LBS | SYSTOLIC BLOOD PRESSURE: 120 MMHG

## 2025-06-02 DIAGNOSIS — Z00.00 ROUTINE GENERAL MEDICAL EXAMINATION AT HEALTH CARE FACILITY: Primary | ICD-10-CM

## 2025-06-02 DIAGNOSIS — C61 PROSTATE CANCER (MULTI): ICD-10-CM

## 2025-06-02 DIAGNOSIS — J06.9 UPPER RESPIRATORY TRACT INFECTION, UNSPECIFIED TYPE: ICD-10-CM

## 2025-06-02 DIAGNOSIS — I10 ESSENTIAL HYPERTENSION: Chronic | ICD-10-CM

## 2025-06-02 PROBLEM — E66.811 CLASS 1 OBESITY WITH BODY MASS INDEX (BMI) OF 30.0 TO 30.9 IN ADULT: Status: RESOLVED | Noted: 2023-10-04 | Resolved: 2025-06-02

## 2025-06-02 PROBLEM — R06.02 SHORTNESS OF BREATH ON EXERTION: Status: RESOLVED | Noted: 2022-11-14 | Resolved: 2025-06-02

## 2025-06-02 PROCEDURE — 1160F RVW MEDS BY RX/DR IN RCRD: CPT | Performed by: FAMILY MEDICINE

## 2025-06-02 PROCEDURE — 1159F MED LIST DOCD IN RCRD: CPT | Performed by: FAMILY MEDICINE

## 2025-06-02 PROCEDURE — 1170F FXNL STATUS ASSESSED: CPT | Performed by: FAMILY MEDICINE

## 2025-06-02 PROCEDURE — 3074F SYST BP LT 130 MM HG: CPT | Performed by: FAMILY MEDICINE

## 2025-06-02 PROCEDURE — 3078F DIAST BP <80 MM HG: CPT | Performed by: FAMILY MEDICINE

## 2025-06-02 PROCEDURE — 1126F AMNT PAIN NOTED NONE PRSNT: CPT | Performed by: FAMILY MEDICINE

## 2025-06-02 PROCEDURE — 99215 OFFICE O/P EST HI 40 MIN: CPT | Performed by: FAMILY MEDICINE

## 2025-06-02 PROCEDURE — G0439 PPPS, SUBSEQ VISIT: HCPCS | Performed by: FAMILY MEDICINE

## 2025-06-02 ASSESSMENT — ENCOUNTER SYMPTOMS
SLEEP DISTURBANCE: 0
CHEST TIGHTNESS: 0
POLYDIPSIA: 0
DIARRHEA: 0
TROUBLE SWALLOWING: 0
CONSTIPATION: 0
PALPITATIONS: 0
ABDOMINAL PAIN: 0
SHORTNESS OF BREATH: 0
BLOOD IN STOOL: 0
DYSPHORIC MOOD: 0
HEADACHES: 0
NERVOUS/ANXIOUS: 0
DIFFICULTY URINATING: 0
UNEXPECTED WEIGHT CHANGE: 0
DIZZINESS: 0
FATIGUE: 0

## 2025-06-02 ASSESSMENT — ACTIVITIES OF DAILY LIVING (ADL)
MANAGING_FINANCES: INDEPENDENT
DOING_HOUSEWORK: INDEPENDENT
TAKING_MEDICATION: INDEPENDENT
BATHING: INDEPENDENT
GROCERY_SHOPPING: INDEPENDENT
DRESSING: INDEPENDENT

## 2025-06-02 ASSESSMENT — PAIN SCALES - GENERAL: PAINLEVEL_OUTOF10: 0-NO PAIN

## 2025-06-02 NOTE — PROGRESS NOTES
Subjective   Reason for Visit: Clifton Gan is an 64 y.o. male here for a Medicare Wellness visit.     Past Medical, Surgical, and Family History reviewed and updated in chart.    Reviewed all medications by prescribing practitioner or clinical pharmacist (such as prescriptions, OTCs, herbal therapies and supplements) and documented in the medical record.    HPI  He presents today for a wellness exam.  Overall has been doing very well.  He states he is lost 20 pounds trying to get his weight down.  He is take-up a new hobby of shooting.  He would like his lead level checked with his next blood test but not today.  I did review his blood test from February with him.  They look excellent.  Colonoscopy due in 2027.   No concerns here today.  He continues to follow with urology.    Patient Care Team:  Hernan Trujillo MD as PCP - General  Hernan Trujillo MD as PCP - O Medicare Advantage PCP  Hernan Trujillo MD as Primary Care Provider     Review of Systems   Constitutional:  Negative for fatigue and unexpected weight change.   HENT:  Negative for congestion and trouble swallowing.    Respiratory:  Negative for chest tightness and shortness of breath.    Cardiovascular:  Negative for chest pain, palpitations and leg swelling.   Gastrointestinal:  Negative for abdominal pain, blood in stool, constipation and diarrhea.   Endocrine: Negative for polydipsia and polyuria.   Genitourinary:  Negative for difficulty urinating.   Neurological:  Negative for dizziness and headaches.   Psychiatric/Behavioral:  Negative for dysphoric mood and sleep disturbance. The patient is not nervous/anxious.    Currently on the tail end of a URI he had last week.     Objective   Vitals:  /72   Pulse 68   Wt 89.1 kg (196 lb 6.4 oz)   BMI 28.18 kg/m²       Physical Exam  Vitals reviewed.   Constitutional:       Appearance: Normal appearance.   HENT:      Right Ear: Tympanic membrane, ear canal and external ear normal.       Left Ear: Tympanic membrane, ear canal and external ear normal.      Nose: Nose normal.      Mouth/Throat:      Mouth: Mucous membranes are moist.      Pharynx: Oropharynx is clear.   Eyes:      Conjunctiva/sclera: Conjunctivae normal.   Neck:      Thyroid: No thyromegaly or thyroid tenderness.      Vascular: No carotid bruit.   Cardiovascular:      Rate and Rhythm: Normal rate and regular rhythm.      Heart sounds: No murmur heard.  Pulmonary:      Effort: Pulmonary effort is normal.      Breath sounds: Normal breath sounds.   Abdominal:      General: Abdomen is flat.      Palpations: Abdomen is soft. There is no mass.      Tenderness: There is no abdominal tenderness.   Musculoskeletal:      Cervical back: Neck supple.      Right lower leg: No edema.      Left lower leg: No edema.   Lymphadenopathy:      Cervical: No cervical adenopathy.   Skin:     General: Skin is warm and dry.   Neurological:      Mental Status: He is alert.   Psychiatric:         Mood and Affect: Mood normal.       Assessment & Plan  Routine general medical examination at health care facility  Doing very well.  He can hold the lisinopril and see how his blood pressure does over the next week or 2.  If it remains less than 140/90 he can remain off of that.  Will see if that helps with his sleepiness later in the day.  He will continue to follow with urology.  He let me know if his URI does not resolve completely by the end of the week.  Up-to-date on his colonoscopy.  Orders:    1 Year Follow Up In Primary Care - Wellness Exam; Future    Essential hypertension         Prostate cancer (Multi)         Upper respiratory tract infection, unspecified type

## 2025-06-02 NOTE — PATIENT INSTRUCTIONS
Things are good.  You may  use an Nader-vicki loop to clean out your ears. Don't use q-tips.     You can stop the lisinopril and see how the BP does at home. You want the BP to be <140/90. If still good, you can even half the amlodipine.

## 2025-06-18 NOTE — PROGRESS NOTES
CARDIO-ONCOLOGY PROGRESS NOTE  Name: Clifton Gan   MRN: [id number]  : 1960  Date of Service: 25  Medical Oncologist: No care team member to display    CHIEF COMPLAINT:  Clifton Gan is a 64 y.o. male who presents to clinic for continued follow up.  New patient visit for me today    HPI:  Oncology History:  -22: NM Tc 99 bone scan reveals no definitive evidence of  metastatic disease. CT C/A/P without definitive evidence of metastatic disease  2022: PSMA PET  with hypermetabolic focus on the R prostate gland  (SUVmax 17.4). No ROYAL  or metastatic disease.  -2022: Cycle 1 BISI 1821  -10/11/2022: Abiraterone held due to grade 3 HTN  -10/14/2022: Resumed Abiraterone  -2022: Start XRT to prostate (complete 22)  -3/8/2023: Off treatment, completed all 6 cycles on [id number]    Cardiology History:  -19:  CACS. Agatson score 94  -22:  Echo with LVEF 55-60%, no significant valvular disease, EKG with NSR  -25:      INTERVAL HISTORY:  -Do dizziness, lightheadedness  -No CP or palpitations  -No dyspnea  -No edema  -Going on a fishing trip in [place] soon  -Enjoys golfing and competitive shooting  -Feels well overall.  No complaints    ROS:  -Negative except as described in interval history    PROBLEM LIST:  Problem List[1]     PAST SURGICAL HISTORY:  Surgical History[2]    MEDICATIONS:  Medications Ordered Prior to Encounter[3]    ALLERGIES:  Allergies[4]    SOCIAL HISTORY:  Social History[5]    FAMILY HISTORY:  Family History[6]    VITAL SIGNS:  /84   Pulse 70   Temp 36.7 °C (98.1 °F)   Resp 18   Wt 89.1 kg (196 lb 6.4 oz)   SpO2 94%   BMI 28.18 kg/m²    Body mass index is 28.18 kg/m².     LABS:  Lab Results   Component Value Date    GLUCOSE 88 2025    CALCIUM 8.6 2025     2025    K 4.3 2025    CO2 24 2025     2025    BUN 19 2025    CREATININE 0.81 2025     Lab Results   Component  "Value Date    WBC 4.3 (L) 02/28/2025    HGB 13.5 02/28/2025    HCT 39.8 (L) 02/28/2025    MCV 91 02/28/2025     02/28/2025     Lab Results   Component Value Date    CHOL 216 (H) 02/28/2025    CHOL 237 (H) 04/08/2023    CHOL 265 (H) 06/29/2022     Lab Results   Component Value Date    HDL 73 02/28/2025    HDL 64 04/08/2023    HDL 61 06/29/2022     Lab Results   Component Value Date    LDLCALC 124 (H) 02/28/2025    LDLCALC 133 (H) 04/08/2023    LDLCALC 171 (H) 06/29/2022     Lab Results   Component Value Date    TRIG 86 02/28/2025    TRIG 74 09/17/2024    TRIG 202 (H) 04/08/2023     No components found for: \"CHOLHDL\"     ECHO:    EKG:  No results found for this or any previous visit (from the past 4464 hours).     PHYSICAL EXAM:  General: Well-developed, well-nourished, NAD  Skin: No rash, jaundice, or lesions  HEENT: Normocephalic, atraumatic.  Sclerae anicteric. MMMI   Cardiovascular:  HRRR.  No murmur, gallop, or rub.  No cyanosis or clubbing.  No JVD.  No edema  Lungs:  CTAB  Musculoskeletal: Full ROM grossly intact  Neurological: A&O x 4  Psychiatric: Mood and affect appropriate    ASSESSMENT/PLAN:  #HTN:  -Well-controlled on 10 mg lisinopril daily per PCP  -No longer has poorly controlled HTN 2/2 abiraterone since he has been off treatment since 2023  -Patient agrees for plan for follow-up prn and no need to schedule regular visits going forward since PCP is managing HTN    #HLD:  -Had discussion of r/b of statin with PCP, with plans for no tx at this time given low CACS      30 minutes spent on face-to-face and non-face-to-face patient care      Gela Johnson, MACRINA-CNP          [1]   Patient Active Problem List  Diagnosis    Bradycardia    Essential hypertension    Impotence of organic origin    Mixed hyperlipidemia    Prostate cancer (Multi)    Umbilical hernia without obstruction and without gangrene    History of palpitations   [2]   Past Surgical History:  Procedure Laterality Date    OTHER SURGICAL " HISTORY  10/10/2022    Umbilical hernia repair   [3]   Current Outpatient Medications on File Prior to Visit   Medication Sig Dispense Refill    amLODIPine (Norvasc) 10 mg tablet Take 1 tablet by mouth once daily 360 tablet 0    aspirin 81 mg EC tablet       mv-min/folic/K1/lycopen/lutein (MEN 50 PLUS MULTIVITAMIN ORAL) Take by mouth.      tamsulosin (Flomax) 0.4 mg 24 hr capsule TAKE 2 CAPSULES BY MOUTH ONCE DAILY 180 capsule 1    [DISCONTINUED] lisinopril 10 mg tablet Take 1 tablet (10 mg) by mouth once daily. (Patient not taking: Reported on 6/19/2025) 90 tablet 3     No current facility-administered medications on file prior to visit.   [4] No Known Allergies  [5]   Social History  Tobacco Use    Smoking status: Some Days     Types: Cigars    Smokeless tobacco: Never   Vaping Use    Vaping status: Never Used   Substance Use Topics    Alcohol use: Yes     Alcohol/week: 7.0 standard drinks of alcohol     Types: 7 Standard drinks or equivalent per week    Drug use: Never   [6]   Family History  Problem Relation Name Age of Onset    Prostate cancer Brother

## 2025-06-19 ENCOUNTER — OFFICE VISIT (OUTPATIENT)
Dept: CARDIOLOGY | Facility: CLINIC | Age: 65
End: 2025-06-19
Payer: MEDICARE

## 2025-06-19 VITALS
OXYGEN SATURATION: 94 % | DIASTOLIC BLOOD PRESSURE: 84 MMHG | HEART RATE: 70 BPM | RESPIRATION RATE: 18 BRPM | BODY MASS INDEX: 28.18 KG/M2 | TEMPERATURE: 98.1 F | SYSTOLIC BLOOD PRESSURE: 132 MMHG | WEIGHT: 196.4 LBS

## 2025-06-19 DIAGNOSIS — E78.5 HYPERLIPIDEMIA, UNSPECIFIED HYPERLIPIDEMIA TYPE: ICD-10-CM

## 2025-06-19 DIAGNOSIS — I10 HYPERTENSION, UNSPECIFIED TYPE: Primary | ICD-10-CM

## 2025-06-19 ASSESSMENT — PAIN SCALES - GENERAL: PAINLEVEL_OUTOF10: 0-NO PAIN

## 2025-09-05 ENCOUNTER — TELEPHONE (OUTPATIENT)
Dept: RADIATION ONCOLOGY | Facility: HOSPITAL | Age: 65
End: 2025-09-05
Payer: MEDICARE